# Patient Record
Sex: FEMALE | Race: WHITE | NOT HISPANIC OR LATINO | Employment: OTHER | URBAN - METROPOLITAN AREA
[De-identification: names, ages, dates, MRNs, and addresses within clinical notes are randomized per-mention and may not be internally consistent; named-entity substitution may affect disease eponyms.]

---

## 2024-05-23 ENCOUNTER — TELEPHONE (OUTPATIENT)
Dept: NEUROLOGY | Facility: CLINIC | Age: 59
End: 2024-05-23

## 2024-05-23 NOTE — TELEPHONE ENCOUNTER
----- Message from Natali Garcia MD sent at 2024 11:26 AM EDT -----  She last had her Emgality in March and it appears to me that her prior authorization  in January and the only reason she had it through March is because she gets a 3-month supply and she thought that she had enough refills not why she did not call us not realizing that it was the prior authorization that .  Not sure otherwise why we did not get a prior Auth renewal from what I can see. Thank you

## 2024-05-23 NOTE — TELEPHONE ENCOUNTER
----- Message from Natali Garcia MD sent at 5/23/2024 11:29 AM EDT -----  I prescribed Nurtec in February and it does not look like we ever got the prior authorization for this if you could help with this prior authorization as well as the Emgality on the last message please and thank you

## 2024-05-23 NOTE — TELEPHONE ENCOUNTER
Urgent Nurtec PA initiated on CMM.   Key: [id number]    Awaiting determination    If Jeff has not responded to your request within 24 hours, contact Jeff at [phone]

## 2024-05-23 NOTE — TELEPHONE ENCOUNTER
Key # below for emgality not going through CMM. Unable to submit    Created a new Key. (Key: [id number])  Constant spinning.    Will retry tmrw

## 2024-05-24 NOTE — TELEPHONE ENCOUNTER
Per CMM-  Message from Plan  Your PA has been resolved, no additional PA is required    Will call pharmacy after 0900

## 2024-05-24 NOTE — TELEPHONE ENCOUNTER
Per Ani  Approved on May 23  Your PA request has been approved. Additional information will be provided in the approval communication. (Message 1149)  Authorization Expiration Date: 2025    Approval letter faxed in media    Called [facility] pharmacy, spoke to Kanwal and advised of the below and above. She verbalized understanding. She got a paid claim for both meds, Nurtec $ 0 copay Emgality, copay $ 180. States that the manufacture coupon on their file . Pt must apply for new coupon card.     Called and advised pt of all of the below and above. She verbalized understanding.

## 2024-06-17 PROBLEM — E61.1 IRON DEFICIENCY: Status: ACTIVE | Noted: 2024-06-17

## 2024-06-17 PROBLEM — G25.81 RESTLESS LEG SYNDROME: Status: ACTIVE | Noted: 2024-06-17

## 2024-06-20 PROBLEM — Z09 HOSPITAL DISCHARGE FOLLOW-UP: Status: RESOLVED | Noted: 2024-01-30 | Resolved: 2024-06-20

## 2024-06-20 PROBLEM — J98.8 RTI (RESPIRATORY TRACT INFECTION): Status: RESOLVED | Noted: 2024-01-30 | Resolved: 2024-06-20

## 2024-06-20 PROBLEM — I10 DIASTOLIC HYPERTENSION: Status: RESOLVED | Noted: 2020-08-14 | Resolved: 2024-06-20

## 2024-06-26 ENCOUNTER — HOSPITAL ENCOUNTER (OUTPATIENT)
Dept: NON INVASIVE DIAGNOSTICS | Facility: CLINIC | Age: 59
Discharge: HOME | End: 2024-06-26
Payer: MEDICARE

## 2024-06-26 ENCOUNTER — HOSPITAL ENCOUNTER (OUTPATIENT)
Dept: ULTRASOUND IMAGING | Facility: HOSPITAL | Age: 59
Discharge: HOME | End: 2024-06-26
Payer: COMMERCIAL

## 2024-06-26 VITALS
HEART RATE: 68 BPM | SYSTOLIC BLOOD PRESSURE: 126 MMHG | HEIGHT: 64 IN | WEIGHT: 169 LBS | BODY MASS INDEX: 28.85 KG/M2 | DIASTOLIC BLOOD PRESSURE: 72 MMHG

## 2024-06-26 DIAGNOSIS — R07.89 OTHER CHEST PAIN: ICD-10-CM

## 2024-06-26 DIAGNOSIS — E04.9 THYROID ENLARGEMENT: ICD-10-CM

## 2024-06-26 LAB
AORTIC ROOT: 3 CM
APICAL FOUR CHAMBER EJECTION FRACTION: 51 %
ASCENDING AORTA: 3.5 CM
BSA FOR ECHO PROCEDURE: 1.82 M2
E WAVE DECELERATION TIME: 224 MS
E/A RATIO: 0.6
FRACTIONAL SHORTENING: 37 (ref 28–44)
INTERVENTRICULAR SEPTUM IN DIASTOLE (PARASTERNAL SHORT AXIS VIEW): 0.9 CM
INTERVENTRICULAR SEPTUM: 0.9 CM (ref 0.6–1.1)
LAAS-AP2: 12.8 CM2
LAAS-AP4: 15.7 CM2
LEFT ATRIUM SIZE: 3.4 CM
LEFT ATRIUM VOLUME (MOD BIPLANE): 36 ML
LEFT ATRIUM VOLUME INDEX (MOD BIPLANE): 19.8 ML/M2
LEFT INTERNAL DIMENSION IN SYSTOLE: 2.9 CM (ref 2.1–4)
LEFT VENTRICULAR INTERNAL DIMENSION IN DIASTOLE: 4.6 CM (ref 3.5–6)
LEFT VENTRICULAR POSTERIOR WALL IN END DIASTOLE: 0.9 CM
LEFT VENTRICULAR STROKE VOLUME: 63 ML
LVSV (TEICH): 63 ML
MV E'TISSUE VEL-SEP: 7 CM/S
MV PEAK A VEL: 0.89 M/S
MV PEAK E VEL: 53 CM/S
MV STENOSIS PRESSURE HALF TIME: 65 MS
MV VALVE AREA P 1/2 METHOD: 3.38
RIGHT ATRIUM AREA SYSTOLE A4C: 12.9 CM2
RIGHT VENTRICLE ID DIMENSION: 3 CM
SL CV LEFT ATRIUM LENGTH A2C: 4.5 CM
SL CV LV EF: 55
SL CV PED ECHO LEFT VENTRICLE DIASTOLIC VOLUME (MOD BIPLANE) 2D: 95 ML
SL CV PED ECHO LEFT VENTRICLE SYSTOLIC VOLUME (MOD BIPLANE) 2D: 32 ML
TR MAX PG: 22 MMHG
TR PEAK VELOCITY: 2.4 M/S
TRICUSPID ANNULAR PLANE SYSTOLIC EXCURSION: 2 CM
TRICUSPID VALVE PEAK REGURGITATION VELOCITY: 2.35 M/S

## 2024-06-26 PROCEDURE — 93306 TTE W/DOPPLER COMPLETE: CPT | Performed by: INTERNAL MEDICINE

## 2024-06-26 PROCEDURE — 76536 US EXAM OF HEAD AND NECK: CPT

## 2024-06-26 PROCEDURE — 93306 TTE W/DOPPLER COMPLETE: CPT

## 2024-06-28 ENCOUNTER — CONSULT (OUTPATIENT)
Dept: PULMONOLOGY | Facility: CLINIC | Age: 59
End: 2024-06-28
Payer: MEDICARE

## 2024-06-28 VITALS
WEIGHT: 167 LBS | OXYGEN SATURATION: 98 % | HEART RATE: 64 BPM | TEMPERATURE: 97.7 F | BODY MASS INDEX: 28.51 KG/M2 | DIASTOLIC BLOOD PRESSURE: 76 MMHG | SYSTOLIC BLOOD PRESSURE: 124 MMHG | HEIGHT: 64 IN

## 2024-06-28 DIAGNOSIS — R06.09 DYSPNEA ON EXERTION: Primary | ICD-10-CM

## 2024-06-28 DIAGNOSIS — M35.05 SJOGREN SYNDROME WITH INFLAMMATORY ARTHRITIS (HCC): ICD-10-CM

## 2024-06-28 DIAGNOSIS — M06.09 RHEUMATOID ARTHRITIS OF MULTIPLE SITES WITH NEGATIVE RHEUMATOID FACTOR (HCC): ICD-10-CM

## 2024-06-28 PROCEDURE — 99204 OFFICE O/P NEW MOD 45 MIN: CPT | Performed by: INTERNAL MEDICINE

## 2024-06-28 RX ORDER — LEVALBUTEROL TARTRATE 45 UG/1
1-2 AEROSOL, METERED ORAL EVERY 6 HOURS PRN
Qty: 15 G | Refills: 2 | Status: SHIPPED | OUTPATIENT
Start: 2024-06-28 | End: 2024-09-26
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
## 2024-07-01 ENCOUNTER — TELEPHONE (OUTPATIENT)
Dept: INFUSION CENTER | Facility: CLINIC | Age: 59
End: 2024-07-01

## 2024-07-01 DIAGNOSIS — G25.81 RESTLESS LEG SYNDROME: ICD-10-CM

## 2024-07-01 DIAGNOSIS — E61.1 IRON DEFICIENCY: Primary | ICD-10-CM

## 2024-07-01 RX ORDER — SODIUM CHLORIDE 9 MG/ML
20 INJECTION, SOLUTION INTRAVENOUS ONCE
Status: CANCELLED | OUTPATIENT
Start: 2024-07-03

## 2024-07-01 NOTE — TELEPHONE ENCOUNTER
Patient called regarding their first visit to infusion center. Reviewed where the infusion center is located and policies with patient regarding fragrances, electronic devices, snacks, home medications, visitor policy, and no show policy. No outstanding questions at this time. Patient aware of appointment on 7/3/24 at 11:30am.

## 2024-07-03 ENCOUNTER — HOSPITAL ENCOUNTER (OUTPATIENT)
Dept: INFUSION CENTER | Facility: CLINIC | Age: 59
Discharge: HOME | End: 2024-07-03
Payer: MEDICARE

## 2024-07-03 VITALS
HEART RATE: 81 BPM | DIASTOLIC BLOOD PRESSURE: 61 MMHG | SYSTOLIC BLOOD PRESSURE: 118 MMHG | RESPIRATION RATE: 17 BRPM | TEMPERATURE: 96.9 F

## 2024-07-03 DIAGNOSIS — E61.1 IRON DEFICIENCY: ICD-10-CM

## 2024-07-03 DIAGNOSIS — G25.81 RESTLESS LEG SYNDROME: Primary | ICD-10-CM

## 2024-07-03 PROCEDURE — 96365 THER/PROPH/DIAG IV INF INIT: CPT

## 2024-07-03 RX ORDER — SODIUM CHLORIDE 9 MG/ML
20 INJECTION, SOLUTION INTRAVENOUS ONCE
Status: CANCELLED | OUTPATIENT
Start: 2024-07-10

## 2024-07-03 RX ORDER — SODIUM CHLORIDE 9 MG/ML
20 INJECTION, SOLUTION INTRAVENOUS ONCE
Status: COMPLETED | OUTPATIENT
Start: 2024-07-03 | End: 2024-07-03

## 2024-07-03 RX ADMIN — SODIUM CHLORIDE 20 ML/HR: 0.9 INJECTION, SOLUTION INTRAVENOUS at 11:35

## 2024-07-03 RX ADMIN — IRON SUCROSE 200 MG: 20 INJECTION, SOLUTION INTRAVENOUS at 11:35

## 2024-07-03 NOTE — PROGRESS NOTES
Pt presents for venofer infusion offering no complaints. Peripheral IV inserted positive blood return noted. Pt tolerated treatment without incident. Peripheral IV removed. AVS declined, next appointment 7/10/24 at 1:30pm.

## 2024-07-10 ENCOUNTER — HOSPITAL ENCOUNTER (OUTPATIENT)
Dept: INFUSION CENTER | Facility: CLINIC | Age: 59
Discharge: HOME | End: 2024-07-10
Payer: MEDICARE

## 2024-07-10 VITALS
SYSTOLIC BLOOD PRESSURE: 143 MMHG | RESPIRATION RATE: 18 BRPM | TEMPERATURE: 96.9 F | HEART RATE: 77 BPM | DIASTOLIC BLOOD PRESSURE: 70 MMHG

## 2024-07-10 DIAGNOSIS — E61.1 IRON DEFICIENCY: Primary | ICD-10-CM

## 2024-07-10 DIAGNOSIS — G25.81 RESTLESS LEG SYNDROME: ICD-10-CM

## 2024-07-10 RX ORDER — SODIUM CHLORIDE 9 MG/ML
20 INJECTION, SOLUTION INTRAVENOUS ONCE
Status: CANCELLED | OUTPATIENT
Start: 2024-07-17

## 2024-07-10 RX ORDER — SODIUM CHLORIDE 9 MG/ML
20 INJECTION, SOLUTION INTRAVENOUS ONCE
Status: COMPLETED | OUTPATIENT
Start: 2024-07-10 | End: 2024-07-10

## 2024-07-10 RX ADMIN — IRON SUCROSE 200 MG: 20 INJECTION, SOLUTION INTRAVENOUS at 13:43

## 2024-07-10 RX ADMIN — SODIUM CHLORIDE 20 ML/HR: 9 INJECTION, SOLUTION INTRAVENOUS at 13:41

## 2024-07-10 NOTE — PROGRESS NOTES
Baylee Villafuerte presents for iv venofer offers no complaints. Infusion completed, pt self medicated with zofran OTC due to nausea from iron.     Baylee Villafuerte is aware of future appt on 7/17 at 1230.     AVS  No (Declined by Baylee Villafuerte) d/c from unit stable

## 2024-07-17 ENCOUNTER — HOSPITAL ENCOUNTER (OUTPATIENT)
Dept: INFUSION CENTER | Facility: CLINIC | Age: 59
Discharge: HOME | End: 2024-07-17
Payer: MEDICARE

## 2024-07-17 VITALS
TEMPERATURE: 96.7 F | SYSTOLIC BLOOD PRESSURE: 138 MMHG | HEART RATE: 79 BPM | RESPIRATION RATE: 16 BRPM | DIASTOLIC BLOOD PRESSURE: 84 MMHG

## 2024-07-17 DIAGNOSIS — E61.1 IRON DEFICIENCY: Primary | ICD-10-CM

## 2024-07-17 DIAGNOSIS — G25.81 RESTLESS LEG SYNDROME: ICD-10-CM

## 2024-07-17 PROCEDURE — 96365 THER/PROPH/DIAG IV INF INIT: CPT

## 2024-07-17 RX ORDER — SODIUM CHLORIDE 9 MG/ML
20 INJECTION, SOLUTION INTRAVENOUS ONCE
Status: CANCELLED | OUTPATIENT
Start: 2024-07-24

## 2024-07-17 RX ORDER — SODIUM CHLORIDE 9 MG/ML
20 INJECTION, SOLUTION INTRAVENOUS ONCE
Status: COMPLETED | OUTPATIENT
Start: 2024-07-17 | End: 2024-07-17

## 2024-07-17 RX ADMIN — IRON SUCROSE 200 MG: 20 INJECTION, SOLUTION INTRAVENOUS at 12:42

## 2024-07-17 RX ADMIN — SODIUM CHLORIDE 20 ML/HR: 9 INJECTION, SOLUTION INTRAVENOUS at 12:38

## 2024-07-17 NOTE — PROGRESS NOTES
Pt to clinic for Venofer. Pt offers no complaints. Pt tolerated infusion without complications. PIV removed. Pt aware of next appointment on 7/24/24 at 1330. AVS declined.

## 2024-07-24 ENCOUNTER — HOSPITAL ENCOUNTER (OUTPATIENT)
Dept: INFUSION CENTER | Facility: CLINIC | Age: 59
Discharge: HOME | End: 2024-07-24
Payer: MEDICARE

## 2024-07-24 VITALS
SYSTOLIC BLOOD PRESSURE: 124 MMHG | RESPIRATION RATE: 18 BRPM | DIASTOLIC BLOOD PRESSURE: 70 MMHG | TEMPERATURE: 98.3 F | HEART RATE: 72 BPM

## 2024-07-24 DIAGNOSIS — E61.1 IRON DEFICIENCY: Primary | ICD-10-CM

## 2024-07-24 DIAGNOSIS — G25.81 RESTLESS LEG SYNDROME: ICD-10-CM

## 2024-07-24 PROCEDURE — 96365 THER/PROPH/DIAG IV INF INIT: CPT

## 2024-07-24 RX ORDER — SODIUM CHLORIDE 9 MG/ML
20 INJECTION, SOLUTION INTRAVENOUS ONCE
Status: CANCELLED | OUTPATIENT
Start: 2024-07-31

## 2024-07-24 RX ORDER — SODIUM CHLORIDE 9 MG/ML
20 INJECTION, SOLUTION INTRAVENOUS ONCE
Status: COMPLETED | OUTPATIENT
Start: 2024-07-24 | End: 2024-07-24

## 2024-07-24 RX ADMIN — SODIUM CHLORIDE 20 ML/HR: 9 INJECTION, SOLUTION INTRAVENOUS at 13:55

## 2024-07-24 RX ADMIN — IRON SUCROSE 200 MG: 20 INJECTION, SOLUTION INTRAVENOUS at 14:04

## 2024-07-24 NOTE — PROGRESS NOTES
Patient to clinic for venofer. Offers no complaints at this time. Denies recent illness and antibiotic use. Tolerated infusion without complications. PIV removed.  Aware of next appointment on 7/31/24 at 12:30 am. AVS declined.

## 2024-07-31 ENCOUNTER — HOSPITAL ENCOUNTER (OUTPATIENT)
Dept: INFUSION CENTER | Facility: CLINIC | Age: 59
Discharge: HOME | End: 2024-07-31
Payer: MEDICARE

## 2024-07-31 VITALS
RESPIRATION RATE: 18 BRPM | TEMPERATURE: 98.5 F | HEART RATE: 68 BPM | SYSTOLIC BLOOD PRESSURE: 137 MMHG | DIASTOLIC BLOOD PRESSURE: 75 MMHG

## 2024-07-31 DIAGNOSIS — E61.1 IRON DEFICIENCY: Primary | ICD-10-CM

## 2024-07-31 DIAGNOSIS — G25.81 RESTLESS LEG SYNDROME: ICD-10-CM

## 2024-07-31 PROCEDURE — 96365 THER/PROPH/DIAG IV INF INIT: CPT

## 2024-07-31 RX ORDER — SODIUM CHLORIDE 9 MG/ML
20 INJECTION, SOLUTION INTRAVENOUS ONCE
Status: CANCELLED | OUTPATIENT
Start: 2024-08-07

## 2024-07-31 RX ORDER — SODIUM CHLORIDE 9 MG/ML
20 INJECTION, SOLUTION INTRAVENOUS ONCE
Status: COMPLETED | OUTPATIENT
Start: 2024-07-31 | End: 2024-07-31

## 2024-07-31 RX ADMIN — SODIUM CHLORIDE 20 ML/HR: 9 INJECTION, SOLUTION INTRAVENOUS at 13:10

## 2024-07-31 RX ADMIN — IRON SUCROSE 200 MG: 20 INJECTION, SOLUTION INTRAVENOUS at 13:09

## 2024-07-31 NOTE — PROGRESS NOTES
Pt to clinic for venofer, offers no complaints today but states she started Keflex for a UTI on Monday this week, spoke with Becki Peguero RN who stated per Edin Lakhani PA-C pt okay to proceed with venofer infusion today, pt tolerated infusion without complications, aware this is her last infusion appointment, PIV removed, avs declined.

## 2024-08-01 ENCOUNTER — OFFICE VISIT (OUTPATIENT)
Dept: GASTROENTEROLOGY | Facility: CLINIC | Age: 59
End: 2024-08-01
Payer: MEDICARE

## 2024-08-01 VITALS
WEIGHT: 167.8 LBS | TEMPERATURE: 97.4 F | DIASTOLIC BLOOD PRESSURE: 78 MMHG | OXYGEN SATURATION: 96 % | HEIGHT: 64 IN | BODY MASS INDEX: 28.65 KG/M2 | SYSTOLIC BLOOD PRESSURE: 121 MMHG | HEART RATE: 67 BPM

## 2024-08-01 DIAGNOSIS — R11.0 NAUSEA: Primary | ICD-10-CM

## 2024-08-01 DIAGNOSIS — K21.9 GASTROESOPHAGEAL REFLUX DISEASE WITHOUT ESOPHAGITIS: ICD-10-CM

## 2024-08-01 DIAGNOSIS — R14.0 BLOATING: ICD-10-CM

## 2024-08-01 DIAGNOSIS — K58.2 IRRITABLE BOWEL SYNDROME WITH BOTH CONSTIPATION AND DIARRHEA: ICD-10-CM

## 2024-08-01 PROCEDURE — 99213 OFFICE O/P EST LOW 20 MIN: CPT | Performed by: PHYSICIAN ASSISTANT

## 2024-08-01 RX ORDER — CEPHALEXIN 500 MG/1
CAPSULE ORAL
COMMUNITY
Start: 2024-07-28
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
## 2024-08-06 ENCOUNTER — OFFICE VISIT (OUTPATIENT)
Age: 59
End: 2024-08-06
Payer: MEDICARE

## 2024-08-06 VITALS
BODY MASS INDEX: 28.68 KG/M2 | WEIGHT: 168 LBS | HEART RATE: 66 BPM | SYSTOLIC BLOOD PRESSURE: 110 MMHG | TEMPERATURE: 97.8 F | HEIGHT: 64 IN | DIASTOLIC BLOOD PRESSURE: 80 MMHG

## 2024-08-06 DIAGNOSIS — Z01.419 ENCOUNTER FOR GYNECOLOGICAL EXAMINATION (GENERAL) (ROUTINE) WITHOUT ABNORMAL FINDINGS: Primary | ICD-10-CM

## 2024-08-06 DIAGNOSIS — N95.2 VAGINAL ATROPHY: ICD-10-CM

## 2024-08-06 DIAGNOSIS — Z12.31 ENCOUNTER FOR SCREENING MAMMOGRAM FOR MALIGNANT NEOPLASM OF BREAST: ICD-10-CM

## 2024-08-06 PROCEDURE — G0101 CA SCREEN;PELVIC/BREAST EXAM: HCPCS | Performed by: STUDENT IN AN ORGANIZED HEALTH CARE EDUCATION/TRAINING PROGRAM

## 2024-08-06 RX ORDER — ESTRADIOL 0.1 MG/G
2 CREAM VAGINAL DAILY
Qty: 42 G | Refills: 3 | Status: SHIPPED | OUTPATIENT
Start: 2024-08-06
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
## 2024-08-07 ENCOUNTER — HOSPITAL ENCOUNTER (OUTPATIENT)
Dept: PULMONOLOGY | Facility: HOSPITAL | Age: 59
Discharge: HOME | End: 2024-08-07
Payer: MEDICARE

## 2024-08-07 DIAGNOSIS — M35.05 SJOGREN SYNDROME WITH INFLAMMATORY ARTHRITIS (HCC): ICD-10-CM

## 2024-08-07 DIAGNOSIS — R06.09 DYSPNEA ON EXERTION: ICD-10-CM

## 2024-08-07 DIAGNOSIS — M06.09 RHEUMATOID ARTHRITIS OF MULTIPLE SITES WITH NEGATIVE RHEUMATOID FACTOR (HCC): ICD-10-CM

## 2024-08-07 PROCEDURE — 94726 PLETHYSMOGRAPHY LUNG VOLUMES: CPT | Performed by: INTERNAL MEDICINE

## 2024-08-07 PROCEDURE — 94729 DIFFUSING CAPACITY: CPT

## 2024-08-07 PROCEDURE — 94060 EVALUATION OF WHEEZING: CPT

## 2024-08-07 PROCEDURE — 94760 N-INVAS EAR/PLS OXIMETRY 1: CPT

## 2024-08-07 PROCEDURE — 94060 EVALUATION OF WHEEZING: CPT | Performed by: INTERNAL MEDICINE

## 2024-08-07 PROCEDURE — 94726 PLETHYSMOGRAPHY LUNG VOLUMES: CPT

## 2024-08-07 PROCEDURE — 94729 DIFFUSING CAPACITY: CPT | Performed by: INTERNAL MEDICINE

## 2024-08-07 RX ORDER — ALBUTEROL SULFATE 0.83 MG/ML
2.5 SOLUTION RESPIRATORY (INHALATION) ONCE
Status: COMPLETED | OUTPATIENT
Start: 2024-08-07 | End: 2024-08-07

## 2024-08-07 RX ADMIN — ALBUTEROL SULFATE 2.5 MG: 2.5 SOLUTION RESPIRATORY (INHALATION) at 11:31

## 2024-08-12 ENCOUNTER — OFFICE VISIT (OUTPATIENT)
Dept: CARDIOLOGY CLINIC | Facility: CLINIC | Age: 59
End: 2024-08-12
Payer: MEDICARE

## 2024-08-12 VITALS
DIASTOLIC BLOOD PRESSURE: 76 MMHG | WEIGHT: 168 LBS | SYSTOLIC BLOOD PRESSURE: 136 MMHG | HEIGHT: 64 IN | OXYGEN SATURATION: 97 % | HEART RATE: 84 BPM | BODY MASS INDEX: 28.68 KG/M2

## 2024-08-12 DIAGNOSIS — R07.89 OTHER CHEST PAIN: Primary | ICD-10-CM

## 2024-08-12 DIAGNOSIS — R00.2 PALPITATIONS: ICD-10-CM

## 2024-08-12 DIAGNOSIS — E78.00 HYPERCHOLESTEROLEMIA: ICD-10-CM

## 2024-08-12 PROCEDURE — 99204 OFFICE O/P NEW MOD 45 MIN: CPT | Performed by: INTERNAL MEDICINE
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
## 2024-08-13 DIAGNOSIS — B00.1 COLD SORE: ICD-10-CM

## 2024-08-13 DIAGNOSIS — E78.00 HYPERCHOLESTEROLEMIA: ICD-10-CM

## 2024-08-13 DIAGNOSIS — E55.9 VITAMIN D DEFICIENCY: ICD-10-CM

## 2024-08-13 DIAGNOSIS — G43.009 MIGRAINE WITHOUT AURA AND WITHOUT STATUS MIGRAINOSUS, NOT INTRACTABLE: ICD-10-CM

## 2024-08-13 DIAGNOSIS — F51.01 PRIMARY INSOMNIA: ICD-10-CM

## 2024-08-13 RX ORDER — ERGOCALCIFEROL 1.25 MG/1
CAPSULE, LIQUID FILLED ORAL
Qty: 12 CAPSULE | Refills: 0 | Status: SHIPPED | OUTPATIENT
Start: 2024-08-13

## 2024-08-13 RX ORDER — ACYCLOVIR 50 MG/G
OINTMENT TOPICAL AS NEEDED
Qty: 30 G | Refills: 0 | Status: SHIPPED | OUTPATIENT
Start: 2024-08-13

## 2024-08-13 RX ORDER — MIRTAZAPINE 45 MG/1
TABLET, FILM COATED ORAL
Qty: 100 TABLET | Refills: 1 | Status: SHIPPED | OUTPATIENT
Start: 2024-08-13

## 2024-08-13 RX ORDER — ATORVASTATIN CALCIUM 10 MG/1
10 TABLET, FILM COATED ORAL DAILY
Qty: 90 TABLET | Refills: 1 | Status: SHIPPED | OUTPATIENT
Start: 2024-08-13

## 2024-08-13 RX ORDER — RIZATRIPTAN BENZOATE 10 MG/1
TABLET ORAL
Qty: 9 TABLET | Refills: 5 | Status: SHIPPED | OUTPATIENT
Start: 2024-08-13

## 2024-08-15 ENCOUNTER — TELEPHONE (OUTPATIENT)
Dept: GASTROENTEROLOGY | Facility: CLINIC | Age: 59
End: 2024-08-15

## 2024-08-21 ENCOUNTER — OFFICE VISIT (OUTPATIENT)
Age: 59
End: 2024-08-21
Payer: MEDICARE

## 2024-08-21 VITALS
HEIGHT: 64 IN | OXYGEN SATURATION: 97 % | DIASTOLIC BLOOD PRESSURE: 71 MMHG | TEMPERATURE: 98.2 F | HEART RATE: 71 BPM | SYSTOLIC BLOOD PRESSURE: 130 MMHG | WEIGHT: 167 LBS | RESPIRATION RATE: 16 BRPM | BODY MASS INDEX: 28.51 KG/M2

## 2024-08-21 DIAGNOSIS — R06.09 DYSPNEA ON EXERTION: Primary | ICD-10-CM

## 2024-08-21 DIAGNOSIS — M35.05 SJOGREN SYNDROME WITH INFLAMMATORY ARTHRITIS (HCC): ICD-10-CM

## 2024-08-21 DIAGNOSIS — M06.09 RHEUMATOID ARTHRITIS OF MULTIPLE SITES WITH NEGATIVE RHEUMATOID FACTOR (HCC): ICD-10-CM

## 2024-08-21 PROCEDURE — G2211 COMPLEX E/M VISIT ADD ON: HCPCS | Performed by: INTERNAL MEDICINE

## 2024-08-21 PROCEDURE — 99214 OFFICE O/P EST MOD 30 MIN: CPT | Performed by: INTERNAL MEDICINE
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
## 2024-08-22 ENCOUNTER — HOSPITAL ENCOUNTER (OUTPATIENT)
Dept: NON INVASIVE DIAGNOSTICS | Facility: HOSPITAL | Age: 59
Discharge: HOME | End: 2024-08-22
Payer: MEDICARE

## 2024-08-22 VITALS — SYSTOLIC BLOOD PRESSURE: 140 MMHG | DIASTOLIC BLOOD PRESSURE: 76 MMHG | HEART RATE: 71 BPM | OXYGEN SATURATION: 98 %

## 2024-08-22 DIAGNOSIS — R00.2 PALPITATIONS: ICD-10-CM

## 2024-08-22 DIAGNOSIS — R07.89 OTHER CHEST PAIN: ICD-10-CM

## 2024-08-22 LAB
MAX HR PERCENT: 87 %
MAX HR: 141 BPM
RATE PRESSURE PRODUCT: NORMAL
SL CV STRESS RECOVERY BP: NORMAL MMHG
SL CV STRESS RECOVERY HR: 83 BPM
SL CV STRESS RECOVERY O2 SAT: 98 %
SL CV STRESS STAGE REACHED: 2
STRESS ANGINA INDEX: 0
STRESS BASELINE BP: NORMAL MMHG
STRESS BASELINE HR: 71 BPM
STRESS O2 SAT REST: 98 %
STRESS PEAK HR: 141 BPM
STRESS POST ESTIMATED WORKLOAD: 7 METS
STRESS POST EXERCISE DUR MIN: 4 MIN
STRESS POST EXERCISE DUR SEC: 31 SEC
STRESS POST O2 SAT PEAK: 98 %
STRESS POST PEAK BP: 170 MMHG

## 2024-08-22 PROCEDURE — 93016 CV STRESS TEST SUPVJ ONLY: CPT | Performed by: INTERNAL MEDICINE

## 2024-08-22 PROCEDURE — 93017 CV STRESS TEST TRACING ONLY: CPT

## 2024-08-22 PROCEDURE — 93018 CV STRESS TEST I&R ONLY: CPT | Performed by: INTERNAL MEDICINE

## 2024-08-23 LAB
CHEST PAIN STATEMENT: NORMAL
MAX DIASTOLIC BP: 72 MMHG
MAX PREDICTED HEART RATE: 161 BPM
PROTOCOL NAME: NORMAL
REASON FOR TERMINATION: NORMAL
STRESS POST EXERCISE DUR MIN: 4 MIN
STRESS POST EXERCISE DUR SEC: 31 SEC
STRESS POST PEAK HR: 141 BPM
STRESS POST PEAK SYSTOLIC BP: 170 MMHG
TARGET HR FORMULA: NORMAL
TEST INDICATION: NORMAL

## 2024-08-27 ENCOUNTER — TELEPHONE (OUTPATIENT)
Dept: NEUROLOGY | Facility: CLINIC | Age: 59
End: 2024-08-27

## 2024-08-27 ENCOUNTER — TELEMEDICINE (OUTPATIENT)
Dept: NEUROLOGY | Facility: CLINIC | Age: 59
End: 2024-08-27
Payer: MEDICARE

## 2024-08-27 DIAGNOSIS — G43.009 MIGRAINE WITHOUT AURA AND WITHOUT STATUS MIGRAINOSUS, NOT INTRACTABLE: Primary | ICD-10-CM

## 2024-08-27 DIAGNOSIS — G47.33 OSA (OBSTRUCTIVE SLEEP APNEA): ICD-10-CM

## 2024-08-27 PROCEDURE — 99215 OFFICE O/P EST HI 40 MIN: CPT | Performed by: PSYCHIATRY & NEUROLOGY

## 2024-08-27 NOTE — PATIENT INSTRUCTIONS
Continue to follow up with Dr. Louis for sleep apnea     Headache/migraine treatment:   Abortive medications (for immediate treatment of a headache):   It is ok to take ibuprofen, acetaminophen or naproxen (Advil, Tylenol,  Aleve, Excedrin) if they help your headaches you should limit these to No more than 3 times a week to avoid medication overuse/rebound headaches.      For your more moderate to severe migraines take this medication early   -     rimegepant sulfate (Nurtec) 75 mg TBDP; Take one NURTEC 75 mg at onset under tongue. Limit 1 in 24 hours.    - will need a prior Auth which may take 1 to 2 weeks and if you do not hear anything let us know and there is a coupon card on the website for the co-pay.  If your insurance prefers Ubrelvy it is a similar medication I will send that in instead.    Rizatriptan 10 mg tabs - take one at the onset of headache. May repeat one time after 1-2 hours if pain has not resolved.   (Max 2 a day and 9 a month)        Prescription preventive medications for headaches/migraines   (to take every day to help prevent headaches - not to take at the time of headache):  [x]Emgality/Galcanezumab  120 mg injections every 28 days     Keep out of direct sunlight. Prior to administration, allow to come to room temperature for 30 minutes. Do not warm using a heat source (eg, microwave or hot water). Do not shake. Administer in abdomen (avoiding 2 inches around the navel), thigh, upper arm, or buttocks avoiding areas of skin that are tender, bruised, red or hard. Deliver entire contents of single-use prefilled pen or syringe.       *Typically these types of medications take time untill you see the benefit, although some may see improvement in days, often it may take weeks, especially if the medication is being titrated up to a beneficial level. Please contact us if there are any concerns or questions regarding the medication.         Lifestyle Recommendations:  [x] SLEEP - Maintain a  regular sleep schedule: Adults need at least 7-8 hours of uninterrupted a night. Maintain good sleep hygiene:  Going to bed and waking up at consistent times, avoiding excessive daytime naps, avoiding caffeinated beverages in the evening, avoid excessive stimulation in the evening and generally using bed primarily for sleeping.  One hour before bedtime would recommend turning lights down lower, decreasing your activity (may read quietly, listen to music at a low volume). When you get into bed, should eliminate all technology (no texting, emailing, playing with your phone, iPad or tablet in bed).  [x] HYDRATION - Maintain good hydration.  Drink  2L of fluid a day (4 typical small water bottles)  [x] DIET - Maintain good nutrition. In particular don't skip meals and try and eat healthy balanced meals regularly.  [x] TRIGGERS - Look for other triggers and avoid them: Limit caffeine to 1-2 cups a day or less. Avoid dietary triggers that you have noticed bring on your headaches (this could include aged cheese, peanuts, MSG, aspartame and nitrates).  [x] EXERCISE - physical exercise as we all know is good for you in many ways, and not only is good for your heart, but also is beneficial for your mental health, cognitive health and  chronic pain/headaches. I would encourage at the least 5 days of physical exercise weekly for at least 30 minutes.      Education and Follow-up  [x] Please call with any questions or concerns. Of course if any new concerning symptoms go to the emergency department.  [x] Follow up 3-6 months, sooner if needed

## 2024-08-27 NOTE — TELEPHONE ENCOUNTER
Called patient to get her 3-6 mon F/U scheduled with Dr Garcia, patient was unable to answer the phone, left  with a call back number.  Printed out AVS and mailed to patient as well.

## 2024-08-27 NOTE — PROGRESS NOTES
Review of Systems   Constitutional:  Negative for appetite change, fatigue and fever.   HENT: Negative.  Negative for hearing loss, tinnitus, trouble swallowing and voice change.    Eyes: Negative.  Negative for photophobia, pain and visual disturbance.   Respiratory: Negative.  Negative for shortness of breath.    Cardiovascular: Negative.  Negative for palpitations.   Gastrointestinal: Negative.  Negative for nausea and vomiting.   Endocrine: Negative.  Negative for cold intolerance.   Genitourinary: Negative.  Negative for dysuria, frequency and urgency.   Musculoskeletal:  Negative for back pain, gait problem, myalgias, neck pain and neck stiffness.   Skin: Negative.  Negative for rash.   Allergic/Immunologic: Negative.    Neurological:  Positive for headaches (down to one migraine a week). Negative for dizziness, tremors, seizures, syncope, facial asymmetry, speech difficulty, weakness, light-headedness and numbness.   Hematological: Negative.  Does not bruise/bleed easily.   Psychiatric/Behavioral: Negative.  Negative for confusion, hallucinations and sleep disturbance.

## 2024-08-27 NOTE — PROGRESS NOTES
Virtual Regular Visit  Name: Baylee Villafuerte      : 1965      MRN: [id number]  Encounter Provider: Natali Garcia MD  Encounter Date: 2024   Encounter department: NEUROLOGY [facility]    Verification of patient location:    Patient is located at Home in the following state in which I hold an active license [place]    Assessment & Plan   1. Migraine without aura and without status migrainosus, not intractable  -     Galcanezumab-gnlm 120 MG/ML SOAJ; Inject 120 mg under the skin every 28 days (3 month supply so she doesn't have to wait for the prescription delayed every month)  2. DIPTI (obstructive sleep apnea)  -     Ambulatory Referral to Otolaryngology; Future        Encounter provider Natali Garcia MD    The patient was identified by name and date of birth. Baylee Villafuerte was informed that this is a telemedicine visit and that the visit is being conducted through the Epic Embedded platform. She agrees to proceed..  My office door was closed. No one else was in the room.  She acknowledged consent and understanding of privacy and security of the video platform. The patient has agreed to participate and understands they can discontinue the visit at any time.    Patient is aware this is a billable service.     History of Present Illness       Assessment/Plan:   Baylee Reza is a very pleasant 58 y.o. female with a past medical history that includes migraines, anxiety, depression, arthralgia, HTN, GERD,  IBS, hypercholesteremia, iron deficiency anemia, kidney stones, osteopenia, insomnia referred here for evaluation of headache.  My initial evaluation 2020     Migraine without aura without status migrainosus, not intractable  Features of idiopathic intracranial hypertension on imaging not meeting criteria for IIH without papilledema  DIPTI not currently using CPAP (home study, BRANDI 16.1, supine 18.8, non supine 3.5, Oxygen down to 75%, while on acetazolamide/Diamox  which also can treat sleep apnea and likely makes this number lower than it is) -following with sleep medicine -8/27/2024 also placed referral to ENT to consider inspire since no longer using CPAP  She reports a long history of headaches and migraines that have varied in frequency and severity throughout life.  Pain is typically left frontal/retro-orbital and sometimes in the back.  She denies aura and reports typical associated migrainous features.  - as of 8/19/2020: chronic daily headaches and Migraines 3-4 days per week for the past year or so  - as of 1/29/2021:   Migraines have drastically improved to only 2-3 per month.  She did not start any new preventative and since she is so improved will hold off on adding anything at this time.  Continue sumatriptan 100 mg as needed for abortive.  - as of 8/27/2021: She reports Migraines a little worse to about 4 migraines a month, willing to add preventative as sumatriptan does not always work well, trial of emgality.   - as of 1/27/2022: Emgality helped a little initially after first loading dose and then pharmacy said she did not have refills and she did not call us so has not had since Sept. Now under a lot of stress, getting about 4 migraines a month, daily mild migraines/headaches. Resume emgality one shot monthly. Continue sumatriptan for abortive.   - as of 7/28/2022: She reports significant improvement on emgality with decreased frequency and severity of migraines to mild 4 a month, and 2 severe a month. Did not realize she could still take sumatriptan so was just taking OTC meds, refilled sumatriptan/imitrex. toradol IM today for migraine since yesterday.  - as of 1/20/2023: She was doing great on Emgality, but last dose was in September or October due to mom passing away and had to leave town to help. Will resume emgality monthly as migraines are again 2-3 days a week, trial of rizatriptan in place of sumatriptan.   - as of 6/21/2023: She reports improvement  on emgality, but not as significant improvement as would be expected and she believes due to the lapse in getting it and we discussed I believe it may be due to DIPTI causing subclinical IIH picture and highly recommend trial of acetazolamide/Diamox.  I believe subclinical IIH actually may be contributing to more symptoms than realized due to tight cerebellar anatomy.  Sleep study to evaluate for sleep apnea although home study can miss it, will likely need in lab.  We discussed this may be why she needs to sleep aids, blood pressure pill and it may be contributing to her vision issues that she has been told are cataracts only.  We discussed sleep apnea can cause significant morbidity/mortality issues if untreated.  Trial of rizatriptan works better than sumatriptan although she is not taking it lately due to starting methotrexate and was told not to take anything on it.  Review of recent CT shows multiple signs of increased intracranial pressure.   - as of 9/21/23: She reports improvement since last visit with on average 2 headaches a week due to emgality decreasing her migraines and acetazolamide 125 mg TID decreasing suspected subtle increased intracranial pressure contributing, following closely with eye doctors and upcoming cataract surgery and no history of papilledema to call this IIH per standard criteria. PCP stopped her chlorthalidone due to BP normalizing. She reports being able to sleep longer and nap more, and I again encouraged scheduling sleep study, interestingly diamox also treats some forms of sleep apnea. Will increase diamox as tolerated to 125 mg 4 times a day while a wake to try and prevent wearing off and add night time dosing if needed when it would be wearing off the longest.  - as of 11/14/2023: She reports migraines remain improved on Emgality with on average 3 a week and rizatriptan typically helps without side effects.  She reports acetazolamide/Diamox 125 mg every 4 hours is helping  although likely not enough especially since she is hardly sleeping at all and we discussed switching to 500 mg twice daily to see if this can help more with less wearing off effect and rebound high blood pressure and can help last throughout the night.  She indeed was diagnosed with sleep apnea at a moderate level and we discussed I highly recommend CPAP and nothing less for treatment despite being positional and she will be following up with sleep medicine in January.  In the meantime if needed can sleep on side or propped up in chair.  Following with eye doctor with persistent vision issues and he consistently does not see papilledema or signs that this would be related although we discussed I think it may be potentially impacting cataracts and she does have upcoming cataract surgery.   - as of 2/22/2024: She has had a difficult time tolerating CPAP thus far and we discussed some of the symptoms she thinks are related to the CPAP side effects sound potentially related to acetazolamide wearing off at 8-hour lucia rather than 12 and could consider increasing to 500 mg long-acting every 8 hours however she reports bothersome side effects and therefore will instead transition to previous 125 mg every 4 hours for 1 to 2 weeks and then can take as needed if needed.  I suspect her headaches could get worse off of this medication, but treating the sleep apnea would help with this, and she received a new mask today that she is going to try.  Waking up with headaches also likely related to wearing off effect, otherwise 2-3 a week.  Emgality has decreased migraine days and will continue.  Rizatriptan worked more in the past and will add trial of Nurtec to see if this can help more and also help with prevention the more she takes it as well as with wearing off of emgality.   - as of 5/23/2024: She reports 1-2 migraines a week and rizatriptan helps sometimes and not others and she never received the Nurtec trial.  This is off  of Emgality the last 2 months with last dose in March.  It appears the prior authorization  in January and we were not aware (but got through March due to 3 month supply), recommended she let us know in the future and will have the nursing team work on the prior Auth now.  Will also have the nursing team look into why the prior authorization was never sent to us for Nurtec can help with this as the more she takes Nurtec it also can help with prevention. She followed up with sleep medicine, they changed her from nasal pillow to fullface, decrease the pressure and is now using her CPAP more regularly, although may not use it some nights on average 7 and half hours with residual AHI 2.2.   - as of 2024: She reports headaches and migraines have improved since last visit and now on average once a week that improves with Nurtec.  She is not currently treating her sleep apnea and we discussed the potential morbidity/mortality of untreated sleep apnea especially at the moderate level with oxygen dropping to 75% and likely underestimated while on acetazolamide/Diamox at the time of the study.  I would recommend she make a follow-up with sleep medicine to discuss sleep apnea being untreated and other options.  She is open to referral to Dr. Munoz to discuss inspire as an alternative as she already wanted to follow-up with ENT as well.  Continue Emgality for migraine prevention.  Since last visit others added duloxetine/Cymbalta for fibromyalgia and she will be increasing it from 30 mg to 60 mg soon.     Workup:  -Noncontrast head CT 4/10/2023:  No acute intracranial abnormality. *we discussed as of my retrospective review 23, the nonspecific signs that I also see on MRI brain 2019 as below  - CTA head and neck with without contrast 2020:  1. No evidence of acute intracranial hemorrhage.  2. No evidence of hemodynamic significant stenosis, aneurysm or dissection.  - MRI brain with without contrast  08/01/2019:  A few nonspecific white matter lesions.  No acute infarction, intracranial hemorrhage or mass.*As of my retrospective review 2/22/2024 she has a partially empty sella that is nearly empty, bilateral optic nerve sheath prominence and tortuosity, tighter ventricles than expected for age 58, cerebellar tonsils overlie the foramen magnum with tight junction right greater than left without Chiari  - If she is open to it, we can obtain follow up MRI Brain to further evaluate at anytime     Preventative:  - we discussed headache hygiene and lifestyle factors that may improve headaches  -  continue Galcanezumab-gnlm 120 MG/ML SOAJ; Inject 120 mg under the skin every 28 days (3 month supply so she doesn't have to wait for the prescription delayed every month). Discussed proper use, possible side effects and risks.  - On through other providers:  mirtazapine 45 mg (through PCP, no longer using ambien at all in year), folic acid, (Chlorthalidone 25 mg was held due to diamox improving BP and they did not resume after she stopped acetazolamide), hydroxychloriquine for years now, Methotrexate since June 2023 and then increased 8/8/23, duloxetine/cymbalta in am 30 mg in July 2024 and this week will increase to 60 mg (for fibromyalgia pain - first two weeks cramps and N/V SE), Vit B12 1000 mcg, estradiol/estrace vaginal cream started since last visit and not used yet, Vit D 50,000 units, atorvastatin/lipitor 10 mg, Rabeprazole/Aciphex 40 mg twice daily for GERD   - past/failed:  Amitriptyline, mirtazapine, topiramate,  Ambien, aimovig contraindicated due to constiptation and latex allergy, Chlorthalidone, acetazolamide SE dry mouth (on top of baseline dry mouth that she has just as bad off of it due to Sjogren's) and tingling bothersome and wore off at 8 hours but couldn't feel she could increase to TID due to SE  - future options:   alternative CGRP med, botox     Abortive:  - discussed not taking over-the-counter or  prescription pain medications more than 3 days per week to prevent medication overuse/rebound headache  - through other providers:  Ondansetron helps nausea  -   rimegepant sulfate (Nurtec) 75 mg TBDP; Take one NURTEC 75 mg at onset under tongue. Limit 1 in 24 hours. Discussed proper use, possible side effects and risks.  -Rizatriptan 10 mg helps the most severe migraines, takes infrequently. Discussed proper use, possible side effects and risks.  - past/failed:  Sumatriptan 50 mg 100 mg, rizatriptan   - past/helped: toradol IM helped, steroids tolerated - prefers not to have it due to stomach irritation   - future options:  prochlorperazine, metoclopramide, Toradol IM or p.o., could consider trial for 5 days of Depakote or dexamethasone 4 prolonged migraine, ubrelvy, reyvow     Patient instructions        Continue to follow up with Dr. Louis for sleep apnea     Headache/migraine treatment:   Abortive medications (for immediate treatment of a headache):   It is ok to take ibuprofen, acetaminophen or naproxen (Advil, Tylenol,  Aleve, Excedrin) if they help your headaches you should limit these to No more than 3 times a week to avoid medication overuse/rebound headaches.      For your more moderate to severe migraines take this medication early   -     rimegepant sulfate (Nurtec) 75 mg TBDP; Take one NURTEC 75 mg at onset under tongue. Limit 1 in 24 hours.    - will need a prior Auth which may take 1 to 2 weeks and if you do not hear anything let us know and there is a coupon card on the website for the co-pay.  If your insurance prefers Ubrelvy it is a similar medication I will send that in instead.    Rizatriptan 10 mg tabs - take one at the onset of headache. May repeat one time after 1-2 hours if pain has not resolved.   (Max 2 a day and 9 a month)        Prescription preventive medications for headaches/migraines   (to take every day to help prevent headaches - not to take at the time of  headache):  [x]Emgality/Galcanezumab  120 mg injections every 28 days     Keep out of direct sunlight. Prior to administration, allow to come to room temperature for 30 minutes. Do not warm using a heat source (eg, microwave or hot water). Do not shake. Administer in abdomen (avoiding 2 inches around the navel), thigh, upper arm, or buttocks avoiding areas of skin that are tender, bruised, red or hard. Deliver entire contents of single-use prefilled pen or syringe.       *Typically these types of medications take time untill you see the benefit, although some may see improvement in days, often it may take weeks, especially if the medication is being titrated up to a beneficial level. Please contact us if there are any concerns or questions regarding the medication.         Lifestyle Recommendations:  [x] SLEEP - Maintain a regular sleep schedule: Adults need at least 7-8 hours of uninterrupted a night. Maintain good sleep hygiene:  Going to bed and waking up at consistent times, avoiding excessive daytime naps, avoiding caffeinated beverages in the evening, avoid excessive stimulation in the evening and generally using bed primarily for sleeping.  One hour before bedtime would recommend turning lights down lower, decreasing your activity (may read quietly, listen to music at a low volume). When you get into bed, should eliminate all technology (no texting, emailing, playing with your phone, iPad or tablet in bed).  [x] HYDRATION - Maintain good hydration.  Drink  2L of fluid a day (4 typical small water bottles)  [x] DIET - Maintain good nutrition. In particular don't skip meals and try and eat healthy balanced meals regularly.  [x] TRIGGERS - Look for other triggers and avoid them: Limit caffeine to 1-2 cups a day or less. Avoid dietary triggers that you have noticed bring on your headaches (this could include aged cheese, peanuts, MSG, aspartame and nitrates).  [x] EXERCISE - physical exercise as we all know is  good for you in many ways, and not only is good for your heart, but also is beneficial for your mental health, cognitive health and  chronic pain/headaches. I would encourage at the least 5 days of physical exercise weekly for at least 30 minutes.      Education and Follow-up  [x] Please call with any questions or concerns. Of course if any new concerning symptoms go to the emergency department.  [x] Follow up 3-6 months, sooner if needed          CC:   We had the pleasure of evaluating Baylee Reza in neurological consultation today. Baylee Reza is a   right handed female who presents today for evaluation of headaches.      History obtained from patient as well as available medical record review.  History of Present Illness:   Interval history as of 8/27/2024  - no significant new or concerning neurologic symptoms since last visit   - DIPTI on CPAP (home study, BRANDI 16.1, supine 18.8, non supine 3.5, Oxygen down to 75%, while on acetazolamide/Diamox which also can treat sleep apnea and likely makes this number lower than it is) -following with sleep medicine - has not been using it at all right now, was struggling and stopped in the middle of June, not sleeping either, dry mouth, gas and atypical CP - following with Dr. Louis    Headaches and migraines   She reports migraines have improved to only approximately once per week and typically respond to Nurtec    However there may be months where she takes more than 8 tabs for headaches that could turn into migraines and therefore needs 16 tabs a month    Preventative:   - emgality every 28 days. -3 mo supply -PA 5/23/25  Denies bothersome side effects      - On through other providers:  mirtazapine 45 mg (through PCP, no longer using ambien at all in year), folic acid, Chlorthalidone 25 mg (held due to diamox improving BP), Has been taking hydroxychloriquine for 1.5 years now, Methotrexate since June 2023 and then increased 8/8/23, duloxetine/cymbalta in am 30 mg  in July 2024 and this week will increase to 60 mg (for fibromyalgia pain - first two weeks cramps and N/V SE), Vit B12 1000 mcg, estradiol/estrace vaginal cream started since last visit and not used yet, Vit D 50,000, atorvastatin/lipitor 10 mg     Abortive:   Trial of Nurtec- works well - happy with it - no copay - initially they only approved 8 and now they let her have 16   - rizatriptan - only used once when very severe since getting nurtec  Denies bothersome side effects        Interval history as of 5/23/2024  - no significant new or concerning neurologic symptoms since last visit   -Since last visit she followed up with GI for GERD - took her off diclyclomine as that can cause dry mouth, PCP managing vitamin D, mammography, tongue sometimes feels thick and burns, whole body pain  - DIPTI  on CPAP  -she followed back up with sleep medicine 4/22/2024- she reports she is still struggling and allergies issues at times, mouth mask makes dry mouth worse from sjorgrens, nasal pillow aerophagia   He raised the humidity and lowered the pressure  He also referred her to hem for low ferritin and referred to heme for iron transfusion   Compliance from 3/17 - 4/15  More than 4 hours 63%, 7 hours and 27 minutes  On APAP 5-15  95 percentile pressure 11.5  Median leak 8.0  Residual AHI 2.2    Headaches and migraines   Baylee gets 1-2 migraines a week and Rizatriptan helps.     Likely more milder headaches that are not as bad     Preventative:   -Acetazolamide/diamox - as of last visit she wanted to come off due to side effects and therefore we transitioned her back to the short acting and then discontinued    - emgality every 28 days. PA Good through 1/28/24 -last dose in March and feels overwhelmed with her medical issues at times and therefore did not let us know    - On through other providers:  mirtazapine 45 mg, (no longer using ambien 5-10 mg tab not used in 4-5 months), folic acid, Chlorthalidone 25 mg (held due to  diamox improving BP), Has been taking hydroxychloriquine for 1.5 years now, Methotrexate since June 2023 and then increased 8/8/23   Denies bothersome side effects     Abortive:   Trial of Nurtec-never received and it looks like we never got the prior authorization form  Rizatriptan -does not always work well sometimes helps sometimes not very much  Denies bothersome side effects     Interval history as of 2/22/2024  - no significant new or concerning neurologic symptoms since last visit   - cataract surgery - Dec 2023 - went well, heavy eyes when not sleeping well, no nerve problems no papilledema  - Jan 26, 2023 ER for deep chest pain, viral illness negative, walking PNA and abs prescribed    - DIPTI not on CPAP right now, tried 2 weeks   - running nose in the morning, acid reflux   - doctor may have to adjust to pressure, goes up to 11 and dry mouth driving her crazy  - had the nose one and today got a new one that goes over her nose, can't do the whole mouth due to dry mouth    Headaches and migraines   Waking up headaches   2-3 a week     Preventative:   -Acetazolamide/diamox 125 mg every 4 hours which was helping was transitioned to 500 mg twice daily - tingling, dry mouth - takes around 8 AM and 6 PM  - emgality every 28 days.   - On through other providers: mirtazapine 45 mg, ambien 5 mg (10 mg tab not used in months), folic acid, Chlorthalidone 25 mg (held due to diamox improving BP), Has been taking hydroxychloriquine for 1.5 years now, Methotrexate since June 2023 and then increased 8/8/23   Denies bothersome side effects     Abortive:   Rizatriptan - no SE and works well sometimes with one, rests and then sometimes it is so intense will take a second dose   Denies bothersome side effects     Interval history as of 11/14/2023  - mom had hydrocephalus - and went blind in 70s  - vertigo episode before seeing me  - huge polyp in the endometrium found recently    - prior to this barely sees from right eye and  upcoming cataract surgery (in the past she had a cataract in left eye and new lens, had to extract it as she was seeing lines everywhere, one day woke up and couldn't see, fluid behind the eye too, had second one and now issues with right eye and going to do that cataract, has not driven in 3 years)    Headaches and migraines   3 times a week and rizatriptan helps them    Preventative:   - diamox 125 mg every 4 hours   - emgality once a month     Abortive:   Rizatriptan - no SE and works well   Denies bothersome side effects      Sleep  - sleep - very poor - untreated DIPTI - 1-2 hours lately, apmt coming up with sleep   The test results are from [facility].  The total time in bed (analysis time) was 498.4 minutes.  The patient had a total of 131 respiratory events made up of 18 obstructive apneas, 0 central apneas, 0 mixed apneas and 113 hypopneas resulting in a respiratory event index (BRANDI) of 16.1.  The lowest SpO2 recorded is 75%.  IMPRESSION:  1. Moderate obstructive sleep apnea with an BRANDI of 16.1 events per hour. Respiratory events were predominantly in supine position with supine BRANDI 18.8 versus nonsupine BRANDI of 3.5events per hour.  2.  Baseline oxygenation adequate.  Respiratory event related hypoxemia with oxygen saturation less than 90% for 11.2 minutes. Oxygen gamal of 75%.        Interval history as of 9/21/2023  - no significant new or concerning neurologic symptoms since last visit   - improvements  - prior to this barely sees from right eye and upcoming cataract surgery (in the past she had a cataract in left eye and new lens, had to extract it as she was seeing lines everywhere, one day woke up and couldn't see, fluid behind the eye too, had second one and now issues with right eye and going to do that cataract, has not driven in 3 years)  - wakes up with a lot of pressure   - sadly she had an ER visit in Jan and they incorrectly somehow report/say she was on medicaid/care when she was not and dealing  with bills on bills and so much stress trying to straighten it out, but then will schedule sleep study  - sleep study not scheduled yet  - hysteroscopy DC coming up 10/4/23    Headaches and migraines   2 headaches a week     Able to sleep longer and nap more     Preventative:   - emgality     - Trial of diamox 250 mg BID - some SE of blurred vision in the middle of the day and then  While I was out 8/14/23 Wrote in and my colleague decreased it to 125 mg BID and I wrote her instead to increase to 125 mg TID as I suspected wearing off effect  - takes 125 mg at 10 am, 4pm and 10 pm  Denies bothersome side effects     Other possible pertinent meds:  - PCP agreed with stopping chlorathiadone after reviewing her pressures in late July  Has been taking hydroxychloriquine for 1.5 years now   Methotrexate since June 2023 and then increased 8/8/23    Abortive:   rizatriptan - works better - helps - no SE   Denies bothersome side effects        Interval history as of 6/21/2023  -On 4/10/2023 she fell down a flight of stairs and was evaluated in ER,  no LOC, does not remember what happened when it happened and she was yelling and screaming down the stairs, head pain, neck pain and back pain and bruised all over, hit head going down, CT scan  - might have to have a hysterectomy   - had a cataract in left eye and new lens, had to extract it as she was seeing lines everywhere, one day woke up and couldn't see, fluid behind the eye too, and due second one and now issues with right eye and going to do that cataract, has not driven in 3 years     Headaches and migraines   She reports significant improvement on Emgality in migraines, she had less, not significantly less lately maybe due to ups and downs   She was started methotrexate and another med, migraines are worse, on it 3rd week    Preventative:   -After last visit resumed Emgality every 28 days which is working without side effects, never worse after   - On through  other providers: mirtazapine 45 mg, ambien 5 mg (not used in over 6 months)  bupropion, Chlorthalidone 25 mg (held due to diamox)    Abortive:   -Trial of rizatriptan in place of sumatriptan works better - not been taking since they told her not to take meds that may impact liver - worked better and often needs second dose, makes her feel a little off  Denies bothersome side effects    Sleep -never had a sleep study  -As of initial visit 8/19/20 -long-term insomnia on average 3 hours, denies problems falling asleep, wakes up approximately 3 times, has been told she snores and wakes up from sleep   - as of 6/21/2023: not much, 2 bad weeks with methtrexate pulls her down helping her sleep   On 2 sleep aids, a blood pressure pill, mom had DIPTI       Interval history as of 1/20/2023  - denies any new or concerning neurologic symptoms since last visit   - mom passed away in Nov 2022, its been hard in the past 4 years, lost only sister, motherin law, sister in law   - dx with sjogrens disease in Sept - gave her answers   - Dec eye lid lift, seeing eye doctor soon for sjogrens   - been struggling for the past 3 weeks with a kidney stone, UTI    Headaches and migraines   2-3 times a week      Preventative: stopped emgality on accident in Sept or Oct, when mom got worse she was on hospice at home and was there for 3 months and then she passed and she stayed there for a while   - On through other providers: mirtazapine, bupropion, Chlorthalidone, ambien 10 mg     Abortive: sumatriptan not helping much  - through other providers: ondansetron helps nausea   Denies bothersome side effects     Interval history as of 7/28/2022  - denies any new or concerning neurologic symptoms since last visit     Headaches and migraines   She reports improvement in frequency   4 mild a month  2 severe     Preventative:   - trial of emgality - 3/7/22, 5 months, next due 8/3/22  Denies bothersome side effects    Abortive:   - sumatriptan - hasnt  taken in a while, didn't realize she could   Denies bothersome side effects     Interval history as of 1/27/2022  - denies any new or concerning neurologic symptoms since last visit   - dealing with a lot of allergies     Headaches and migraines   - emgality helped initially after first dose and then pharmacy said she did not have refills, now getting about 4 migraines a month, daily mild migraines/headaches    Preventative: trial of emgality - got the first 2 shots in mid Sept  Abortive: sumatriptan 100 mg  Denies bothersome side effects     Interval history as of 8/27/2021  - denies any new or concerning neurologic symptoms since last visit   - She has been following with orthopedics, rehab, Fam med for other issues including chronic pain right ankle    Headaches and migraines   - a lot less migraines than before, they seem to come when stressed, about 4 migraines a month   Preventative:   - magnesium - not taking   Abortive: - sumatriptan 100 mg - often helps   Denies bothersome side effects       interval history as of 01/29/2021:   - PCP started on BP med  - a lot of stress lately, death of sister, death of mother in law,  MI in sept  - PCP started xanax    Headaches and migraines   Some improvement with decreased stress, 2-3 migraines a month     Preventive: didn't start ajovy, off topiramate, taking magnesium    - taking amitriptyline 10 mg just occasionally and having SE   Abortive: sumatriptan and zofran help        Headaches started at what age? 19s years old  How often do the headaches occur?   - as of 8/19/2020: chronic daily headaches and Migraines 3-4 days per week for the past year or so  What time of the day do the headaches start?  Wakes up with it   How long do the headaches last? 2-4 hours with treatment, without all day   Are you ever headache free? Yes     Aura? without aura     Last eye exam:   Cataract surgery 2018 - has been about a year since last exam as of 8/19/2020      Where is  your headache located and pain quality?   - left frontal, retro-orbital - stabbing, throbbing, sharp  - sometimes to the back   What is the intensity of pain? Average: 4-5/10, worst 10/10  Associated symptoms:   [x] Nausea       [x] Vomiting          [x] Stiff or sore neck   [x] Problems with concentration  [x] Photophobia     [x]Phonophobia      [] Osmophobia  [x] Blurred vision   [x] Prefer quiet, dark room  [x] Light-headed or dizzy      [] Tinnitus   [] Hands or feet tingle or feel numb/paresthesias       [] Red ear      [x] Ptosis - maybe      [] Facial droop  [] Lacrimation  [] Nasal congestion/rhinorrhea   [] Flushing of face  [] Change in pupil size     Things that make the headache worse? No specific movements, any movement      Headache triggers:    Menses in the past      Have you seen someone else for headaches or pain? Yes, PCP  Have you had trigger point injection performed and how often? No  Have you had Botox injection performed and how often? No   Have you had epidural injections or transforaminal injections performed? No  Are you current pregnant or planning on getting pregnant? Menopause at age 41  Have you ever had any Brain imaging? Yes      What medications do you take or have you taken for your headaches?   ABORTIVE:    OTC medications have been ineffective      Sumatriptan 50 mg - dry mouth, maybe a little nausea   Ondansetron - helps      PREVENTIVE:      Magnesium 400 mg  Amitriptyline 10 mg (started 8/14/20) - sometimes forgets      For BP: chlorthalidone 25 mg   For sleep:  Ambien 10 mg prn, mirtazapine 45 mg   For mood: not taking xanax     Past:  Topiramate 50 mg b.i.d. (started 5/2020, increased from 25 mg BID 8/14/20) - stopped         LIFESTYLE  Sleep   - averages: 3 hours  Problems falling asleep?:   no  Problems staying asleep?:  Yes, 3 times   - snores  - never had a sleep study      Water: 32 oz or less per day  Caffeine: 0 per day     Mood: anxiety, depression  - sees a  psychiatrist - gets mirtazapine   - counseling in the past      The following portions of the patient's history were reviewed and updated as appropriate: allergies, current medications, past family history, past medical history, past social history, past surgical history and problem list.     Pertinent family history:  Family history of headaches:  migraine headaches in mother and sister  Any family history of aneurysms - No     Pertinent social history:  Work: no  Lives with       Illicit Drugs: denies  Alcohol/tobacco: Denies alcohol use, Denies tobacco use     Pertinent lab results:   See EMR for recent labs  7/23/20: CMP unremarkable, CBC unremarkable except for WBC 10.9  10/02/2019 vitamin-D 33, TSH 1.4     EKG 7/23/20: normal sinus rhythm, normal ECG, rate 80,       Imaging:   I have personally reviewed imaging and radiology read   -Noncontrast head CT 4/10/2023:  No acute intracranial abnormality. *we discussed as of my retrospective review 9/21/23, the nonspecific signs that I also see on MRI brain 2019 as below  - CTA head and neck with without contrast 07/23/2020:  1. No evidence of acute intracranial hemorrhage.  2. No evidence of hemodynamic significant stenosis, aneurysm or dissection.  - MRI brain with without contrast 08/01/2019:  A few nonspecific white matter lesions.  No acute infarction, intracranial hemorrhage or mass.*As of my retrospective review 2/22/2024 she has a partially empty sella that is nearly empty, bilateral optic nerve sheath prominence and tortuosity, tighter ventricles than expected for age 58, cerebellar tonsils overlie the foramen magnum with tight junction right greater than left         Objective     There were no vitals taken for this visit.    Objective:     Exam limited by Video  Physical Exam:                                                                 Vitals:            Constitutional:    There were no vitals taken for this visit.  BP Readings from Last  3 Encounters:   08/22/24 140/76   08/21/24 130/71   08/12/24 136/76     Pulse Readings from Last 3 Encounters:   08/22/24 71   08/21/24 71   08/12/24 84         Well developed, well nourished, No dysmorphic features.         Normocephalic atraumatic.     Normal behavior and appropriate affect        Able to answer questions appropriately, provide history of recent events   Normal language and spontaneous speech.  facial expression symmetric  symmetric bulk throughout. no atrophy, fasciculations or significant abnormal movements noted during our visit from observation.        Review of Systems:   ROS obtained by medical assistant and reviewed, but if any symptoms listed below say negative, does not mean patient has not had this symptom since last visit, please see HPI for details of symptoms discussed this visit.  Regarding any abnormal or positive findings in ROS that are not neurologic related, patient instructed to address these issues with PCP or go to the ER if they are severe.    Review of Systems   Constitutional:  Negative for appetite change, fatigue and fever.   HENT: Negative.  Negative for hearing loss, tinnitus, trouble swallowing and voice change.    Eyes: Negative.  Negative for photophobia, pain and visual disturbance.   Respiratory: Negative.  Negative for shortness of breath.    Cardiovascular: Negative.  Negative for palpitations.   Gastrointestinal: Negative.  Negative for nausea and vomiting.   Endocrine: Negative.  Negative for cold intolerance.   Genitourinary: Negative.  Negative for dysuria, frequency and urgency.   Musculoskeletal:  Negative for back pain, gait problem, myalgias, neck pain and neck stiffness.   Skin: Negative.  Negative for rash.   Allergic/Immunologic: Negative.    Neurological:  Positive for headaches (down to one migraine a week). Negative for dizziness, tremors, seizures, syncope, facial asymmetry, speech difficulty, weakness, light-headedness and numbness.    Hematological: Negative.  Does not bruise/bleed easily.   Psychiatric/Behavioral: Negative.  Negative for confusion, hallucinations and sleep disturbance         I have spent 31 minutes with Patient today in which greater than 50% of this time was spent in counseling/coordination of care regarding Risks and benefits of tx options, Instructions for management, Patient and family education, Importance of tx compliance, Risk factor reductions, Impressions, Counseling / Coordination of care, Documenting in the medical record, Obtaining or reviewing history  , and Communicating with other healthcare professionals . I also spent 10 minutes non face to face for this patient the same day.           Visit Time  Total Visit Duration: 41

## 2024-09-03 ENCOUNTER — APPOINTMENT (OUTPATIENT)
Dept: LAB | Facility: HOSPITAL | Age: 59
End: 2024-09-03
Payer: MEDICARE

## 2024-09-03 DIAGNOSIS — D53.9 NUTRITIONAL ANEMIA: ICD-10-CM

## 2024-09-03 DIAGNOSIS — G25.81 RESTLESS LEG SYNDROME: ICD-10-CM

## 2024-09-03 DIAGNOSIS — M35.00 SICCA SYNDROME (HCC): ICD-10-CM

## 2024-09-03 DIAGNOSIS — E61.1 IRON DEFICIENCY: ICD-10-CM

## 2024-09-03 LAB
ALBUMIN SERPL BCG-MCNC: 4 G/DL (ref 3.5–5)
ALP SERPL-CCNC: 90 U/L (ref 34–104)
ALT SERPL W P-5'-P-CCNC: 24 U/L (ref 7–52)
ANION GAP SERPL CALCULATED.3IONS-SCNC: 5 MMOL/L (ref 4–13)
AST SERPL W P-5'-P-CCNC: 20 U/L (ref 13–39)
BASOPHILS # BLD AUTO: 0.02 THOUSANDS/ÂΜL (ref 0–0.1)
BASOPHILS NFR BLD AUTO: 0 % (ref 0–1)
BILIRUB SERPL-MCNC: 0.53 MG/DL (ref 0.2–1)
BUN SERPL-MCNC: 14 MG/DL (ref 5–25)
CALCIUM SERPL-MCNC: 9.4 MG/DL (ref 8.4–10.2)
CHLORIDE SERPL-SCNC: 108 MMOL/L (ref 96–108)
CHOLEST SERPL-MCNC: 165 MG/DL
CO2 SERPL-SCNC: 30 MMOL/L (ref 21–32)
CREAT SERPL-MCNC: 0.92 MG/DL (ref 0.6–1.3)
CRP SERPL QL: 2.4 MG/L
EOSINOPHIL # BLD AUTO: 0.07 THOUSAND/ÂΜL (ref 0–0.61)
EOSINOPHIL NFR BLD AUTO: 1 % (ref 0–6)
ERYTHROCYTE [DISTWIDTH] IN BLOOD BY AUTOMATED COUNT: 14 % (ref 11.6–15.1)
ERYTHROCYTE [SEDIMENTATION RATE] IN BLOOD: 18 MM/HOUR (ref 0–29)
FOLATE SERPL-MCNC: >22.3 NG/ML
GFR SERPL CREATININE-BSD FRML MDRD: 68 ML/MIN/1.73SQ M
GLUCOSE P FAST SERPL-MCNC: 93 MG/DL (ref 65–99)
HCT VFR BLD AUTO: 40.8 % (ref 34.8–46.1)
HDLC SERPL-MCNC: 43 MG/DL
HGB BLD-MCNC: 12.9 G/DL (ref 11.5–15.4)
IMM GRANULOCYTES # BLD AUTO: 0 THOUSAND/UL (ref 0–0.2)
IMM GRANULOCYTES NFR BLD AUTO: 0 % (ref 0–2)
LDLC SERPL CALC-MCNC: 106 MG/DL (ref 0–100)
LYMPHOCYTES # BLD AUTO: 1.68 THOUSANDS/ÂΜL (ref 0.6–4.47)
LYMPHOCYTES NFR BLD AUTO: 34 % (ref 14–44)
MCH RBC QN AUTO: 31 PG (ref 26.8–34.3)
MCHC RBC AUTO-ENTMCNC: 31.6 G/DL (ref 31.4–37.4)
MCV RBC AUTO: 98 FL (ref 82–98)
MONOCYTES # BLD AUTO: 0.36 THOUSAND/ÂΜL (ref 0.17–1.22)
MONOCYTES NFR BLD AUTO: 7 % (ref 4–12)
NEUTROPHILS # BLD AUTO: 2.77 THOUSANDS/ÂΜL (ref 1.85–7.62)
NEUTS SEG NFR BLD AUTO: 58 % (ref 43–75)
NONHDLC SERPL-MCNC: 122 MG/DL
NRBC BLD AUTO-RTO: 0 /100 WBCS
PLATELET # BLD AUTO: 219 THOUSANDS/UL (ref 149–390)
PMV BLD AUTO: 10.2 FL (ref 8.9–12.7)
POTASSIUM SERPL-SCNC: 3.7 MMOL/L (ref 3.5–5.3)
PROT SERPL-MCNC: 6.8 G/DL (ref 6.4–8.4)
RBC # BLD AUTO: 4.16 MILLION/UL (ref 3.81–5.12)
SODIUM SERPL-SCNC: 143 MMOL/L (ref 135–147)
TRIGL SERPL-MCNC: 82 MG/DL
VIT B12 SERPL-MCNC: 1012 PG/ML (ref 180–914)
WBC # BLD AUTO: 4.9 THOUSAND/UL (ref 4.31–10.16)

## 2024-09-03 PROCEDURE — 85652 RBC SED RATE AUTOMATED: CPT

## 2024-09-03 PROCEDURE — 80061 LIPID PANEL: CPT

## 2024-09-03 PROCEDURE — 82746 ASSAY OF FOLIC ACID SERUM: CPT

## 2024-09-03 PROCEDURE — 86140 C-REACTIVE PROTEIN: CPT

## 2024-09-03 PROCEDURE — 36415 COLL VENOUS BLD VENIPUNCTURE: CPT

## 2024-09-03 PROCEDURE — 85025 COMPLETE CBC W/AUTO DIFF WBC: CPT

## 2024-09-03 PROCEDURE — 80053 COMPREHEN METABOLIC PANEL: CPT

## 2024-09-03 PROCEDURE — 82607 VITAMIN B-12: CPT

## 2024-09-12 ENCOUNTER — APPOINTMENT (OUTPATIENT)
Dept: LAB | Facility: HOSPITAL | Age: 59
End: 2024-09-12
Payer: MEDICARE

## 2024-09-12 ENCOUNTER — OFFICE VISIT (OUTPATIENT)
Dept: HEMATOLOGY ONCOLOGY | Facility: CLINIC | Age: 59
End: 2024-09-12
Payer: MEDICARE

## 2024-09-12 ENCOUNTER — TELEPHONE (OUTPATIENT)
Dept: SURGICAL ONCOLOGY | Facility: CLINIC | Age: 59
End: 2024-09-12

## 2024-09-12 VITALS
HEIGHT: 64 IN | TEMPERATURE: 98.3 F | WEIGHT: 166 LBS | DIASTOLIC BLOOD PRESSURE: 88 MMHG | BODY MASS INDEX: 28.34 KG/M2 | OXYGEN SATURATION: 96 % | SYSTOLIC BLOOD PRESSURE: 128 MMHG | HEART RATE: 82 BPM | RESPIRATION RATE: 15 BRPM

## 2024-09-12 DIAGNOSIS — E53.8 B12 DEFICIENCY: ICD-10-CM

## 2024-09-12 DIAGNOSIS — E61.1 IRON DEFICIENCY: ICD-10-CM

## 2024-09-12 DIAGNOSIS — E61.1 IRON DEFICIENCY: Primary | ICD-10-CM

## 2024-09-12 DIAGNOSIS — G25.81 RESTLESS LEG SYNDROME: ICD-10-CM

## 2024-09-12 LAB
FERRITIN SERPL-MCNC: 273 NG/ML (ref 11–307)
IRON SATN MFR SERPL: 32 % (ref 15–50)
IRON SERPL-MCNC: 80 UG/DL (ref 50–212)
TIBC SERPL-MCNC: 250 UG/DL (ref 250–450)
UIBC SERPL-MCNC: 170 UG/DL (ref 155–355)

## 2024-09-12 PROCEDURE — 36415 COLL VENOUS BLD VENIPUNCTURE: CPT

## 2024-09-12 PROCEDURE — 99214 OFFICE O/P EST MOD 30 MIN: CPT | Performed by: PHYSICIAN ASSISTANT

## 2024-09-12 PROCEDURE — 83540 ASSAY OF IRON: CPT

## 2024-09-12 PROCEDURE — 83550 IRON BINDING TEST: CPT

## 2024-09-12 PROCEDURE — 82728 ASSAY OF FERRITIN: CPT

## 2024-09-12 NOTE — TELEPHONE ENCOUNTER
Called patient if she is able to come in earlier for appointment today 9/12/24 with Prabha Weber PA-C, patient is able to come in earlier and may come around 10-10:15AM.
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
## 2024-09-17 ENCOUNTER — HOSPITAL ENCOUNTER (OUTPATIENT)
Dept: NUCLEAR MEDICINE | Facility: HOSPITAL | Age: 59
Discharge: HOME | End: 2024-09-17
Payer: MEDICARE

## 2024-09-17 DIAGNOSIS — R11.0 NAUSEA: ICD-10-CM

## 2024-09-17 PROCEDURE — 78264 GASTRIC EMPTYING IMG STUDY: CPT

## 2024-09-17 PROCEDURE — A9541 TC99M SULFUR COLLOID: HCPCS

## 2024-09-19 ENCOUNTER — TELEPHONE (OUTPATIENT)
Dept: HEMATOLOGY ONCOLOGY | Facility: CLINIC | Age: 59
End: 2024-09-19

## 2024-09-19 NOTE — TELEPHONE ENCOUNTER
Spoke with patient to make her aware of the following from [place],   You had a very good response to the IV iron.  Your ferritin level is 275.  It is not recommended to exceed ferritin levels of 300 for treatment of restless leg syndrome.  I do not feel strongly you would benefit from any additional IV iron at this time given your current iron panel.  I would recommend that you follow-up with your PCP and sleep medicine physician for further management of your restless leg syndrome as I do not think that if his symptoms are still present but they are related to your iron levels.     Please let me know if you have any additional questions or concerns.           She verbalized understanding and agreed to plan  Appointment cancelled with Prabha for January  Will FU with office PRN

## 2024-10-01 ENCOUNTER — OFFICE VISIT (OUTPATIENT)
Age: 59
End: 2024-10-01
Payer: MEDICARE

## 2024-10-01 ENCOUNTER — COSMETIC (OUTPATIENT)
Age: 59
End: 2024-10-01

## 2024-10-01 VITALS — TEMPERATURE: 98.4 F | HEIGHT: 64 IN | WEIGHT: 161.2 LBS | BODY MASS INDEX: 27.52 KG/M2

## 2024-10-01 DIAGNOSIS — L82.1 SEBORRHEIC KERATOSIS: Primary | ICD-10-CM

## 2024-10-01 DIAGNOSIS — L21.9 SEBORRHEIC DERMATITIS: ICD-10-CM

## 2024-10-01 DIAGNOSIS — D18.01 CHERRY ANGIOMA: ICD-10-CM

## 2024-10-01 DIAGNOSIS — L82.0 LICHENOID KERATOSIS: ICD-10-CM

## 2024-10-01 DIAGNOSIS — D22.9 MULTIPLE BENIGN MELANOCYTIC NEVI: ICD-10-CM

## 2024-10-01 DIAGNOSIS — L91.8 SKIN TAGS, MULTIPLE ACQUIRED: Primary | ICD-10-CM

## 2024-10-01 DIAGNOSIS — L81.4 LENTIGO: ICD-10-CM

## 2024-10-01 PROCEDURE — SKNTGDERM SKIN TAG DERM ADD ON: Performed by: DERMATOLOGY

## 2024-10-01 PROCEDURE — 99204 OFFICE O/P NEW MOD 45 MIN: CPT | Performed by: DERMATOLOGY

## 2024-10-01 RX ORDER — KETOCONAZOLE 20 MG/G
CREAM TOPICAL DAILY
Qty: 30 G | Refills: 2 | Status: SHIPPED | OUTPATIENT
Start: 2024-10-01

## 2024-10-01 NOTE — PROGRESS NOTES
"[facility] Note     Patient Name: Baylee Villafuerte  Encounter Date: 10/1/2024     Have you been cared for by a [facility] Dermatologist in the last 3 years and, if so, which description applies to you?    NO.   I am considered a \"new\" patient and must complete all patient intake questions. I am FEMALE/of child-bearing potential.    REVIEW OF SYSTEMS:  Have you recently had or currently have any of the following? Recent fever or chills? No  Any non-healing wound? No  Are you pregnant or planning to become pregnant? No  Are you currently or planning to be nursing or breast feeding? No   PAST MEDICAL HISTORY:  Have you personally ever had or currently have any of the following?  If \"YES,\" then please provide more detail. Skin cancer (such as Melanoma, Basal Cell Carcinoma, Squamous Cell Carcinoma?  No  Tuberculosis, HIV/AIDS, Hepatitis B or C: No  Radiation Treatment No   HISTORY OF IMMUNOSUPPRESSION:   Do you have a history of any of the following:  Systemic Immunosuppression such as Diabetes, Biologic or Immunotherapy, Chemotherapy, Organ Transplantation, Bone Marrow Transplantation or Prednsione?  YES, Methotrexate 2.5 mg, Sjogren, Rheumatoid arthritis      Answering \"YES\" requires the addition of the dotphrase \"IMMUNOSUPPRESSED\" as the first diagnosis of the patient's visit.   FAMILY HISTORY:  Any \"first degree relatives\" (parent, brother, sister, or child) with the following?    Skin Cancer, Pancreatic or Other Cancer? No   PATIENT EXPERIENCE:    Do you want the Dermatologist to perform a COMPLETE skin exam today including a clinical examination under the \"bra and underwear\" areas?  Yes  If necessary, do we have your permission to call and leave a detailed message on your Preferred Phone number that includes your specific medical information?  Yes      Allergies   Allergen Reactions    Megavite Fruits & Veggies [Anti-Oxidant] Rash     Rash , hives- strawberries, cantoulope, honeydew, " watermelon, oranges, bananas    Other Hives     Seasonal     Codeine Hives, Itching, GI Intolerance and Rash     stomach cramps  rash    Nuts - Food Allergy Rash     Hazelnuts, peanuts, walnuts, sesame seeds, pumpkin seeds- hives      Current Outpatient Medications:     acyclovir (ZOVIRAX) 5 % ointment, APPLY TOPICALLY IF NEEDED (FOR COLD SORES), Disp: 30 g, Rfl: 0    atorvastatin (LIPITOR) 10 mg tablet, TAKE 1 TABLET BY MOUTH EVERY DAY, Disp: 90 tablet, Rfl: 1    Auvi-Q 0.3 MG/0.3ML SOAJ, as needed Epi pen, Disp: , Rfl:     carboxymethylcellulose (REFRESH PLUS) 0.5 % SOLN, Administer 2 drops to both eyes 2 (two) times a day as needed for dry eyes  , Disp: , Rfl:     DULoxetine (CYMBALTA) 30 mg delayed release capsule, Take 30 mg by mouth daily, Disp: , Rfl:     ergocalciferol (VITAMIN D2) 50,000 units, TAKE 1 CAPSULE BY MOUTH ONE TIME PER WEEK, Disp: 12 capsule, Rfl: 0    folic acid (FOLVITE) 1 mg tablet, Take 2,000 mcg by mouth 2 (two) times a day, Disp: , Rfl:     Galcanezumab-gnlm 120 MG/ML SOAJ, Inject 120 mg under the skin every 28 days (3 month supply so she doesn't have to wait for the prescription delayed every month), Disp: 3 mL, Rfl: 4    hydroxychloroquine (PLAQUENIL) 200 mg tablet, Take 200 mg by mouth 2 (two) times a day with meals, Disp: , Rfl:     methocarbamol (Robaxin-750) 750 mg tablet, Take 1 tablet (750 mg total) by mouth 3 (three) times a day as needed for muscle spasms, Disp: 30 tablet, Rfl: 3    methotrexate 2.5 MG tablet, Take 2.5 mg by mouth once a week 4 tablets on Monday and 2 tablets on Tuesday, Disp: , Rfl:     mirtazapine (REMERON) 45 MG tablet, TAKE 1 TABLET (45 MG TOTAL) BY MOUTH AS NEEDED AT BEDTIME, Disp: 100 tablet, Rfl: 1    ondansetron (ZOFRAN-ODT) 4 mg disintegrating tablet, Take 1 tablet (4 mg total) by mouth every 8 (eight) hours as needed for nausea or vomiting, Disp: 60 tablet, Rfl: 6    RABEprazole (ACIPHEX) 20 MG tablet, TAKE 2 TABLETS BY MOUTH 2 TIMES A DAY., Disp: 360  "tablet, Rfl: 1    rimegepant sulfate (Nurtec) 75 mg TBDP, Take one NURTEC 75 mg at onset on (or under) tongue daily as needed for migraine, Disp: 16 tablet, Rfl: 11    rizatriptan (MAXALT) 10 mg tablet, TAKE 1 TABLET (10 MG TOTAL) BY MOUTH ONCE AS NEEDED FOR MIGRAINE MAY REPEAT IN 2 HOURS IF NEEDED., Disp: 9 tablet, Rfl: 5    Xiidra 5 % op solution, Administer to both eyes 2 (two) times a day, Disp: , Rfl:     estradiol (ESTRACE) 0.1 mg/g vaginal cream, Insert 2 g into the vagina daily Use daily for 2 weeks, then decrease to 2-3 times per week until no longer necessary (Patient not taking: Reported on 9/12/2024), Disp: 42 g, Rfl: 3    predniSONE 5 mg tablet, 10 mg for 1 week- 5 mg for 1 week then 2.5 mg for 1 week (Patient not taking: Reported on 9/12/2024), Disp: , Rfl:     VIT B12-METHIONINE-INOS-CHOL IM, , Disp: , Rfl:           Whom besides the patient is providing clinical information about today's encounter?   NO ADDITIONAL HISTORIAN (patient alone provided history)    Physical Exam and Assessment/Plan by Diagnosis:    Patient is present for skin check, \\    LICHENOID KERATOSIS (\"BENIGN LICHENOID KERATOSIS\")    Physical Exam:  Anatomic Location Affected:  right breast   Morphological Description:  pink flat top scaly papule   Pertinent Positives:  Pertinent Negatives:    Additional History of Present Condition:  present on exam     Assessment and Plan:  Based on a thorough discussion of this condition and the management approach to it (including a comprehensive discussion of the known risks, side effects and potential benefits of treatment), the patient (family) agrees to implement the following specific plan:  Reassured benign     What is lichenoid keratosis?  Lichenoid keratosis is usually a small, solitary, inflamed macule or thin pigmented plaque. Multiple eruptive lichenoid keratoses in sun-exposed sites are also described. Their colour varies from an initial reddish brown to a greyish purple/brown as " the lesion resolves several weeks or months later.    Lichenoid keratosis is also known as benign lichenoid keratosis, solitary lichen planus, lichen planus-like keratosis and involuting lichenoid plaque. They are one of the causes of atypical solar lentigo.    Who gets lichenoid keratosis?  Lichenoid keratosis generally develops in fair-skinned patients aged 30-80 years. It is twice as common in females as than males. It is most commonly seen in Caucasians and rarely affects Asians,  Americans or Hispanics.    What causes lichenoid keratosis?  Lichenoid keratosis is an inflammatory reaction arising in a regressing existing solar lentigo or seborrhoeic keratosis. It is not known what triggers the reaction, but triggers include minor trauma such as friction, drugs, dermatitis, and sun exposure.    How is lichenoid keratosis diagnosed?  Lichenoid keratosis is diagnosed by its clinical and dermoscopic appearance, which reveals uniform clusters of grey dots and, depending on the stage of the lesion, may show signs of an original pre-existing lentigo or seborrhoeic keratosis. In time, signs of the original lesion disappear. Later on the grey dots also disappear, as the lesion resolves to reveal normal skin.  Because clinical examination and dermatoscopy may not be able to differentiate between lichenoid keratosis and other solitary erythematous lesions that could be melanocytic, non-melanocytic benign, malignant or inflammatory, a punch or shave skin biopsy may be necessary.    Histopathology of resembles that of lichen planus or lichenoid drug eruption, with some slight differences. Remnants of the original solar lentigo or seborrhoeic keratosis may be evident.    What are the clinical features of lichenoid keratosis?    Classic, bullous, or atypical  Acute rapidly developing lesion (present for <3 months)  Erythematous or pinkish papule or plaque  Dermoscopy may show remnants of pigment network, subtle blotches  of brown colour, clusters of grey dots plus dotted, irregular linear and other shaped telangiectatic blood vessels  Early or interface subtype  Subacute lesions present for 3 months to one year  Erythematous to dusky-red or hyper-pigmented brown lesion  Depending on the age of lesion, dermoscopy may show features of a solar lentigo or flat seborrhoeic keratosis with moth-eaten borders, fingerprinting, milia-like cysts, comedo-like openings, plus small foci of melanophages (grey dots).  Late regressed or atrophic subtype  Lesions have been present for more than one year  May be violaceous (violet-coloured) papules or irregularly distributed lesions with shades of brown or grey  Other features of lichenoid keratosis are:  A solitary lesion is present in 90% of cases of lichenoid keratosis, with other patients presenting with few to many lesions.  It is most commonly found on the upper trunk, followed by the distal upper extremities, and less commonly on the head and neck.  Size ranges from a few millimetres to one centimetre or more in size.  The skin surface may be smooth, scaly or warty.  The lesion is often symptomless or it may be itchy or have a mild stinging sensation.    What is the management of lichenoid keratosis?  Lichenoid keratosis is harmless and resolves spontaneously. If there is any doubt about the diagnosis, dermatoscopic digital images can be taken and used in follow-up a few months later.  Lichenoid keratosis can be removed if desired by liquid nitrogen, electrosurgery or curettage.  Multiple eruptive lichenoid keratoses may be effectively treated with the oral retinoid, acitretin.  To date there have been no reports of lichenoid keratosis turning into malignant skin tumours.     SEBORRHEIC DERMATITIS    Physical Exam:  Anatomic Location Affected:  forehead   Morphological Description:  pink scaly patches  Pertinent Positives:  Pertinent Negatives:    Additional History of Present Condition:   "patient notes that it started after she had shingles    Assessment and Plan:  Based on a thorough discussion of this condition and the management approach to it (including a comprehensive discussion of the known risks, side effects and potential benefits of treatment), the patient (family) agrees to implement the following specific plan:  Reassured benign   Ketoconazole cream daily for 1-2 weeks for flares      Seborrheic Dermatitis   Seborrheic dermatitis is a common, chronic or relapsing form of eczema/dermatitis that mainly affects the sebaceous, gland-rich regions of the scalp, face, and trunk.  There are infantile and adult forms of seborrhoeic dermatitis. It is sometimes associated with psoriasis and, in that clinical scenario, may be referred to as \"sebo-psoriasis.\"  Seborrheic dermatitis is also known as \"seborrheic eczema.\"  Dandruff (also called \"pityriasis capitis\") is an uninflamed form of seborrhoeic dermatitis. Dandruff presents as bran-like scaly patches scattered within hair-bearing areas of the scalp.  In an infant, this condition may be referred to as \"cradle cap.\"  The cause of seborrheic dermatitis is not completely understood. It is associated with proliferation of various species of the skin commensal Malassezia, in its yeast (non-pathogenic) form. Its metabolites (such as the fatty acids oleic acid, malssezin, and indole-3-carbaldehyde) may cause an inflammatory reaction. Differences in skin barrier lipid content and function may account for individual presentations.    Infantile Seborrheic Dermatitis  Infantile seborrheic dermatitis affects babies under the age of 3 months and usually resolves by 6-12 months of age.  Infantile seborrheic dermatitis causes \"cradle cap\" (diffuse, greasy scaling on scalp). The rash may spread to affect armpit and groin folds (a type of \"napkin dermatitis\").  There may be associated salmon-pink colored patches that may flake or peel.  The rash in this case is " "usually not especially itchy, so the baby often appears undisturbed by the rash, even when more generalized.    Adult Seborrheic Dermatitis  Adult seborrheic dermatitis tends to begin in late adolescence; prevalence is greatest in young adults and in the elderly. It is more common in males than in females.    The following factors are sometimes associated with severe adult seborrheic dermatitis:  Oily skin  Familial tendency to seborrhoeic dermatitis or a family history of psoriasis  Immunosuppression: organ transplant recipient, human immunodeficiency virus (HIV) infection and patients with lymphoma  Neurological and psychiatric diseases: Parkinson disease, tardive dyskinesia, depression, epilepsy, facial nerve palsy, spinal cord injury and congenital disorders such as Down syndrome  Treatment for psoriasis with psoralen and ultraviolet A (PUVA) therapy  Lack of sleep  Stressful events.    In adults, seborrheic dermatitis may typically affect the scalp, face (creases around the nose, behind ears, within eyebrows) and upper trunk. Typical clinical features include:  Winter flares, improving in summer following sun exposure  Minimal itch most of the time  Combination oily and dry mid-facial skin  Ill-defined localized scaly patches or diffuse scale in the scalp  Blepharitis; scaly red eyelid margins  [place]-pink, thin, scaly, and ill-defined plaques in skin folds on both sides of the face  Petal or ring-shaped flaky patches on hair-line and on anterior chest  Rash in armpits, under the breasts, in the groin folds and genital creases  Superficial folliculitis (inflamed hair follicles) on cheeks and upper trunk    Seborrheic dermatitis is diagnosed by its clinical appearance and behavior. Skin biopsy may be helpful but is rarely necessary to make this diagnosis.     MELANOCYTIC NEVI (\"Moles\")    Physical Exam:  Anatomic Location Affected:   Mostly on sun-exposed areas of the trunk and extremities  Morphological " "Description:  Scattered, 1-4mm round to ovoid, symmetrical-appearing, even bordered, skin colored to dark brown macules/papules, mostly in sun-exposed areas  Pertinent Positives:  Pertinent Negatives:    Additional History of Present Condition:      Assessment and Plan:  Based on a thorough discussion of this condition and the management approach to it (including a comprehensive discussion of the known risks, side effects and potential benefits of treatment), the patient (family) agrees to implement the following specific plan:  When outside we recommend using a wide brim hat, sunglasses, long sleeve and pants, sunscreen with SPF 30+ with reapplication every 2 hours, or SPF specific clothing   Benign, reassured  Annual skin check     Melanocytic Nevi  Melanocytic nevi (\"moles\") are tan or brown, raised or flat areas of the skin which have an increased number of melanocytes. Melanocytes are the cells in our body which make pigment and account for skin color.    Some moles are present at birth (I.e., \"congenital nevi\"), while others come up later in life (i.e., \"acquired nevi\").  The sun can stimulate the body to make more moles.  Sunburns are not the only thing that triggers more moles.  Chronic sun exposure can do it too.     Clinically distinguishing a healthy mole from melanoma may be difficult, even for experienced dermatologists. The \"ABCDE's\" of moles have been suggested as a means of helping to alert a person to a suspicious mole and the possible increased risk of melanoma.  The suggestions for raising alert are as follows:    Asymmetry: Healthy moles tend to be symmetric, while melanomas are often asymmetric.  Asymmetry means if you draw a line through the mole, the two halves do not match in color, size, shape, or surface texture. Asymmetry can be a result of rapid enlargement of a mole, the development of a raised area on a previously flat lesion, scaling, ulceration, bleeding or scabbing within the mole.  " "Any mole that starts to demonstrate \"asymmetry\" should be examined promptly by a board certified dermatologist.     Border: Healthy moles tend to have discrete, even borders.  The border of a melanoma often blends into the normal skin and does not sharply delineate the mole from normal skin.  Any mole that starts to demonstrate \"uneven borders\" should be examined promptly by a board certified dermatologist.     Color: Healthy moles tend to be one color throughout.  Melanomas tend to be made up of different colors ranging from dark black, blue, white, or red.  Any mole that demonstrates a color change should be examined promptly by a board certified dermatologist.     Diameter: Healthy moles tend to be smaller than 0.6 cm in size; an exception are \"congenital nevi\" that can be larger.  Melanomas tend to grow and can often be greater than 0.6 cm (1/4 of an inch, or the size of a pencil eraser). This is only a guideline, and many normal moles may be larger than 0.6 cm without being unhealthy.  Any mole that starts to change in size (small to bigger or bigger to smaller) should be examined promptly by a board certified dermatologist.     Evolving: Healthy moles tend to \"stay the same.\"  Melanomas may often show signs of change or evolution such as a change in size, shape, color, or elevation.  Any mole that starts to itch, bleed, crust, burn, hurt, or ulcerate or demonstrate a change or evolution should be examined promptly by a board certified dermatologist.        LENTIGO    Physical Exam:  Anatomic Location Affected:  trunk, arms  Morphological Description:  Light brown macules  Pertinent Positives:  Pertinent Negatives:    Additional History of Present Condition:      Assessment and Plan:  Based on a thorough discussion of this condition and the management approach to it (including a comprehensive discussion of the known risks, side effects and potential benefits of treatment), the patient (family) agrees to " implement the following specific plan:  When outside we recommend using a wide brim hat, sunglasses, long sleeve and pants, sunscreen with SPF 30+ with reapplication every 2 hours, or SPF specific clothing       What is a lentigo?  A lentigo is a pigmented flat or slightly raised lesion with a clearly defined edge. Unlike an ephelis (freckle), it does not fade in the winter months. There are several kinds of lentigo.  The name lentigo originally referred to its appearance resembling a small lentil. The plural of lentigo is lentigines, although “lentigos” is also in common use.    Who gets lentigines?  Lentigines can affect males and females of all ages and races. Solar lentigines are especially prevalent in fair skinned adults. Lentigines associated with syndromes are present at birth or arise during childhood.    What causes lentigines?  Common forms of lentigo are due to exposure to ultraviolet radiation:  Sun damage including sunburn   Indoor tanning   Phototherapy, especially photochemotherapy (PUVA)    Ionizing radiation, eg radiation therapy, can also cause lentigines.  Several familial syndromes associated with widespread lentigines originate from mutations in Iftikhar-MAP kinase, mTOR signaling and PTEN pathways.    What is the treatment for lentigines?  Most lentigines are left alone. Attempts to lighten them may not be successful. The following approaches are used:  SPF 50+ broad-spectrum sunscreen   Hydroquinone bleaching cream   Alpha hydroxy acids   Vitamin C   Retinoids   Azelaic acid   Chemical peels  Individual lesions can be permanently removed using:  Cryotherapy   Intense pulsed light   Pigment lasers    How can lentigines be prevented?  Lentigines associated with exposure ultraviolet radiation can be prevented by very careful sun protection. Clothing is more successful at preventing new lentigines than are sunscreens.    What is the outlook for lentigines?  Lentigines usually persist. They may  "increase in number with age and sun exposure. Some in sun-protected sites may fade and disappear.    VALDES ANGIOMAS    Physical Exam:  Anatomic Location Affected:  trunk  Morphological Description:  Scattered cherry red, 1-4 mm papules.  Pertinent Positives:  Pertinent Negatives:    Additional History of Present Condition:      Assessment and Plan:  Based on a thorough discussion of this condition and the management approach to it (including a comprehensive discussion of the known risks, side effects and potential benefits of treatment), the patient (family) agrees to implement the following specific plan:  Monitor for changes  Benign, reassured  Advised to schedule an appt at [facility] for removal        Assessment and Plan:    Cherry angioma, also known as Jasso de Sushil spots, are benign vascular skin lesions. A \"cherry angioma\" is a firm red, blue or purple papule, 0.1-1 cm in diameter. When thrombosed, they can appear black in colour until evaluated with a dermatoscope when the red or purple colour is more easily seen. Cherry angioma may develop on any part of the body but most often appear on the scalp, face, lips and trunk.  An angioma is due to proliferating endothelial cells; these are the cells that line the inside of a blood vessel.    Angiomas can arise in early life or later in life; the most common type of angioma is a cherry angioma.  Cherry angiomas are very common in males and females of any age or race. They are more noticeable in white skin than in skin of colour. They markedly increase in number from about the age of 40. There may be a family history of similar lesions. Eruptive cherry angiomas have been rarely reported to be associated with internal malignancy. The cause of angiomas is unknown. Genetic analysis of cherry angiomas has shown that they frequently carry specific somatic missense mutations in the GNAQ and GNA11 (Q209H) genes, which are involved in other vascular and " "melanocytic proliferations.      SEBORRHEIC KERATOSIS; NON-INFLAMED    Physical Exam:  Anatomic Location Affected:  trunk  Morphological Description:  Flat and raised, waxy, smooth to warty textured, yellow to brownish-grey to dark brown to blackish, discrete, \"stuck-on\" appearing papules.  Pertinent Positives:  Pertinent Negatives:    Additional History of Present Condition:      Assessment and Plan:  Based on a thorough discussion of this condition and the management approach to it (including a comprehensive discussion of the known risks, side effects and potential benefits of treatment), the patient (family) agrees to implement the following specific plan:  Monitor for changes  Benign, reassured      Seborrheic Keratosis  A seborrheic keratosis is a harmless warty spot that appears during adult life as a common sign of skin aging.  Seborrheic keratoses can arise on any area of skin, covered or uncovered, with the usual exception of the palms and soles. They do not arise from mucous membranes. Seborrheic keratoses can have highly variable appearance.      Seborrheic keratoses are extremely common. It has been estimated that over 90% of adults over the age of 60 years have one or more of them. They occur in males and females of all races, typically beginning to erupt in the 30s or 40s. They are uncommon under the age of 20 years.  The precise cause of seborrhoeic keratoses is not known.  Seborrhoeic keratoses are considered degenerative in nature. As time goes by, seborrheic keratoses tend to become more numerous. Some people inherit a tendency to develop a very large number of them; some people may have hundreds of them.      There is no easy way to remove multiple lesions on a single occasion.  Unless a specific lesion is \"inflamed\" and is causing pain or stinging/burning or is bleeding, most insurance companies do not authorize treatment.      Scribe Attestation      I,:  Bertha Cordova MA am acting " as a scribe while in the presence of the attending physician.:       I,:  Sonam Hernández MD personally performed the services described in this documentation    as scribed in my presence.:

## 2024-10-01 NOTE — PROGRESS NOTES
[facility] Dermatology Clinic Note     Patient Name: Baylee Villafuerte  Encounter Date: 10/1/2024       DO NOT BILL INSURANCE, PATIENT PAID $100 OUT OF POCKET       PROCEDURE:  DESTRUCTION OF ACROCHORDONS    We again discussed methods of removal including that any procedural treatment may not be covered by insurance and would then be the patient's responsibility:       Location: neck, abdomen and back     Description: Flesh colored papules      Number of Lesions Treated: 5     Treatment of lesions chosen: Cryotherapy      After a thorough discussion of treatment options and risk/benefits/alternatives (including but not limited to local pain, scarring, dyspigmentation, blistering, and possible superinfection), verbal and written consent were obtained and the aforementioned lesions were treated on with cryotherapy using liquid nitrogen x 1 cycle for 5-10 seconds.    TOTAL NUMBER of 5 pre-malignant lesions were treated today on the ANATOMIC LOCATION: neck, abdomen, and back.     The patient tolerated the procedure well, and after-care instructions were provided.      Anesthesia: none     Postop care reviewed and discussed: it was recommended to keep area clean with soap and water and keep area clean      Scribe Attestation      I,:  Bertha Cordova MA am acting as a scribe while in the presence of the attending physician.:       I,:  Sonam Hernández MD personally performed the services described in this documentation    as scribed in my presence.:

## 2024-10-04 ENCOUNTER — NURSE TRIAGE (OUTPATIENT)
Age: 59
End: 2024-10-04

## 2024-10-04 DIAGNOSIS — R11.0 NAUSEA: Primary | ICD-10-CM

## 2024-10-04 DIAGNOSIS — K31.84 GASTROPARESIS: ICD-10-CM

## 2024-10-04 RX ORDER — METOCLOPRAMIDE 5 MG/1
5 TABLET ORAL 4 TIMES DAILY
Qty: 60 TABLET | Refills: 3 | Status: SHIPPED | OUTPATIENT
Start: 2024-10-04

## 2024-10-04 NOTE — TELEPHONE ENCOUNTER
"Patient calling in states that she wants to get a message to Alexsandra. States that ever since she ate she's been having severe abd pain and vomiting.  She took the Zofran and diclycomine with no relief. States that she doesn't want to go to the ER but would like her to prescribe the medication that she offered to her for these symptoms.   Reason for Disposition   SEVERE abdominal pain (e.g., excruciating)    Answer Assessment - Initial Assessment Questions  1. LOCATION: \"Where does it hurt?\"      generalized    3. ONSET: \"When did the pain begin?\" (e.g., minutes, hours or days ago)       chronic    6. SEVERITY: \"How bad is the pain?\"  (e.g., Scale 1-10; mild, moderate, or severe)  severe  7. RECURRENT SYMPTOM: \"Have you ever had this type of stomach pain before?\" If Yes, ask: \"When was the last time?\" and \"What happened that time?\"     Ongoing   8. CAUSE: \"What do you think is causing the stomach pain?\"     gastroparesis  9. RELIEVING/AGGRAVATING FACTORS: \"What makes it better or worse nothing.    Protocols used: Abdominal Pain - Female-ADULT-[place]    "

## 2024-10-04 NOTE — TELEPHONE ENCOUNTER
Spoke with patient,reviewed provider recommendations. Told if pain worsens or she cannot stop vomiting she has to go to the emergency room.  Pt voiced understanding.

## 2024-10-09 ENCOUNTER — OFFICE VISIT (OUTPATIENT)
Dept: GASTROENTEROLOGY | Facility: CLINIC | Age: 59
End: 2024-10-09
Payer: MEDICARE

## 2024-10-09 VITALS
WEIGHT: 163.2 LBS | BODY MASS INDEX: 27.86 KG/M2 | SYSTOLIC BLOOD PRESSURE: 130 MMHG | HEIGHT: 64 IN | DIASTOLIC BLOOD PRESSURE: 80 MMHG | TEMPERATURE: 97.5 F | OXYGEN SATURATION: 97 % | HEART RATE: 73 BPM

## 2024-10-09 DIAGNOSIS — K58.1 IRRITABLE BOWEL SYNDROME WITH CONSTIPATION: ICD-10-CM

## 2024-10-09 DIAGNOSIS — K31.84 GASTROPARESIS: Primary | ICD-10-CM

## 2024-10-09 PROCEDURE — 99213 OFFICE O/P EST LOW 20 MIN: CPT | Performed by: PHYSICIAN ASSISTANT

## 2024-10-09 NOTE — PROGRESS NOTES
"Ambulatory Visit  Name: Baylee Villafuerte      : 1965      MRN: [id number]  Encounter Provider: Debbie Dowling PA-C  Encounter Date: 10/9/2024   Encounter department: [facility] [place]    Assessment & Plan  Gastroparesis  GES noted a T 1/2 of 170 minutes  She started low dose Reglan - 5mg QID  She is not sure if it is helping yet    Reviewed a gastroparesis diet in detail - handout provided    Reviewed that we can increase Reglan to 10mg QID       Irritable bowel syndrome with constipation  Worsening as she has been limiting her fiber with gastroparesis  Advised starting Miralax 17g 1-2 times per day         History of Present Illness       Baylee Villafuerte is a 58 y.o. female with a history of rheumatoid arthritis, sicca syndrome, hypertension, hyperlipidemia, GERD, gastroparesis, anxiety who presents for follow-up of irritable bowel syndrome and gastroparesis.  She continues with significant upper GI symptoms of nausea, poor appetite and epigastric and left upper quadrant pain.  She reports that the left upper quadrant pain continues to be consistent but exacerbates at times.  Last week she agreed to start a very low-dose Reglan.  She is taking 5 mg 4 times daily for the past few days.  She does not know if it is helping yet.  She reports worsening constipation.  She believes that this is due to having to limit her fiber intake.  She has no rectal bleeding.  She is not taking anything for this.      Objective     /80 (BP Location: Right arm, Patient Position: Sitting, Cuff Size: Standard)   Pulse 73   Temp 97.5 °F (36.4 °C) (Temporal)   Ht 5' 4\" (1.626 m)   Wt 74 kg (163 lb 3.2 oz)   SpO2 97%   BMI 28.01 kg/m²     Physical Exam  Abdominal:      General: Abdomen is flat. Bowel sounds are normal.      Palpations: Abdomen is soft.      Tenderness: There is abdominal tenderness.         "

## 2024-10-11 ENCOUNTER — TELEPHONE (OUTPATIENT)
Dept: CARDIOLOGY CLINIC | Facility: CLINIC | Age: 59
End: 2024-10-11

## 2024-10-11 NOTE — TELEPHONE ENCOUNTER
Spoke to pt, apologized for the delay. Not sure what happened. Pt was understanding. Advised that I ordered her monitor right away. Should arrive in 2-3 business days. Gave monitor instructions.

## 2024-10-11 NOTE — TELEPHONE ENCOUNTER
Caller: Baylee    Doctor: Dr. Galaviz    Reason for call: pt called to inform us that she has yet to receive her ZIO, as discussed in her last office visit she was supposed to wear this for 2 weeks looks like order was placed on 8/12/24     Call back#: [phone]

## 2024-10-16 ENCOUNTER — TELEPHONE (OUTPATIENT)
Age: 59
End: 2024-10-16

## 2024-10-16 NOTE — TELEPHONE ENCOUNTER
Patient calling because she stated her machine she got from [facility] she doesn't want to use it anymore.  She was told by [facility] to call her office. Please assist.

## 2024-10-20 DIAGNOSIS — K21.9 GASTROESOPHAGEAL REFLUX DISEASE, UNSPECIFIED WHETHER ESOPHAGITIS PRESENT: ICD-10-CM

## 2024-10-21 RX ORDER — RABEPRAZOLE SODIUM 20 MG/1
40 TABLET, DELAYED RELEASE ORAL 2 TIMES DAILY
Qty: 360 TABLET | Refills: 1 | Status: SHIPPED | OUTPATIENT
Start: 2024-10-21

## 2024-10-22 NOTE — TELEPHONE ENCOUNTER
Returned call from patient to discuss.      Patient states she will be returning CPAP.  She's been struggling with it for quite a while now.  It's been difficult due to allergies, migraines and a recent diagnosis of gastroparesis.  Dr. Garcia placed a referral to ENT for her to pursue Inspire.  She also had insurance issues related to the machine and it's been stressful.      Discussed with patient that if she pursues Inspire, she would still need to follow up with sleep to activate and mange the device.  Patient aware.     Scheduled appointment 10/24/24 with [employer] liaison to return machine.  She is aware she will need to sign paperwork that states she is returning the machine against medical advice.

## 2024-10-28 ENCOUNTER — TELEPHONE (OUTPATIENT)
Age: 59
End: 2024-10-28

## 2024-10-28 NOTE — TELEPHONE ENCOUNTER
Patient is wearing a Zio monitor for two weeks. Due to remove in two days. Patient is scheduled for a bone density screening tomorrow and asked if ok to remove monitor one day early.  I advised ok to do if just one day short of 14 days.

## 2024-10-29 ENCOUNTER — HOSPITAL ENCOUNTER (OUTPATIENT)
Dept: RADIOLOGY | Facility: IMAGING CENTER | Age: 59
Discharge: HOME | End: 2024-10-29
Payer: MEDICARE

## 2024-10-29 DIAGNOSIS — M85.89 OSTEOPENIA OF MULTIPLE SITES: ICD-10-CM

## 2024-10-29 PROCEDURE — 77080 DXA BONE DENSITY AXIAL: CPT

## 2024-11-07 ENCOUNTER — CLINICAL SUPPORT (OUTPATIENT)
Dept: CARDIOLOGY CLINIC | Facility: CLINIC | Age: 59
End: 2024-11-07

## 2024-11-07 DIAGNOSIS — R07.89 OTHER CHEST PAIN: ICD-10-CM

## 2024-11-07 DIAGNOSIS — R00.2 PALPITATIONS: ICD-10-CM

## 2024-11-12 ENCOUNTER — OFFICE VISIT (OUTPATIENT)
Dept: GASTROENTEROLOGY | Facility: CLINIC | Age: 59
End: 2024-11-12
Payer: MEDICARE

## 2024-11-12 VITALS
HEART RATE: 78 BPM | WEIGHT: 161 LBS | DIASTOLIC BLOOD PRESSURE: 81 MMHG | HEIGHT: 64 IN | BODY MASS INDEX: 27.49 KG/M2 | TEMPERATURE: 97.2 F | SYSTOLIC BLOOD PRESSURE: 129 MMHG | OXYGEN SATURATION: 98 %

## 2024-11-12 DIAGNOSIS — R10.12 LEFT UPPER QUADRANT ABDOMINAL PAIN: ICD-10-CM

## 2024-11-12 DIAGNOSIS — R11.0 NAUSEA: ICD-10-CM

## 2024-11-12 DIAGNOSIS — K21.9 GASTROESOPHAGEAL REFLUX DISEASE, UNSPECIFIED WHETHER ESOPHAGITIS PRESENT: Primary | ICD-10-CM

## 2024-11-12 DIAGNOSIS — K31.84 GASTROPARESIS: ICD-10-CM

## 2024-11-12 PROCEDURE — 99213 OFFICE O/P EST LOW 20 MIN: CPT | Performed by: PHYSICIAN ASSISTANT

## 2024-11-12 RX ORDER — METOCLOPRAMIDE 5 MG/1
10 TABLET ORAL 4 TIMES DAILY
Qty: 240 TABLET | Refills: 3 | Status: SHIPPED | OUTPATIENT
Start: 2024-11-12

## 2024-11-12 RX ORDER — NORTRIPTYLINE HYDROCHLORIDE 10 MG/1
10 CAPSULE ORAL
Qty: 30 CAPSULE | Refills: 3 | Status: SHIPPED | OUTPATIENT
Start: 2024-11-12

## 2024-11-12 RX ORDER — LEUCOVORIN CALCIUM 10 MG/1
TABLET ORAL
COMMUNITY
Start: 2024-10-24

## 2024-11-12 NOTE — PROGRESS NOTES
Ambulatory Visit  Name: Baylee Villafuerte      : 1965      MRN: [id number]  Encounter Provider: Debbie Dowling PA-C  Encounter Date: 2024   Encounter department: [facility] [place]    Assessment & Plan  Gastroesophageal reflux disease, unspecified whether esophagitis present  Rabeprazole 40mg BID  EGD 2023 with a small hiatal hernia    Gastroparesis  She admits to some improvement in appetite and nausea with Reglan 5mg QID but symptoms are still pretty significant  Increase to 10mg QID  Continue a gastroparesis diet  Left upper quadrant abdominal pain  Still persistent and significant  EGD, Colonoscopy, US, CT all without obvious cause  She is agreeable to trying a TCA again   She previously tried Amitriptyline which caused drowsiness  She is on Mirtazapine qHS - advised to stop this and use only Nortriptyline       History of Present Illness     Baylee Villafuerte is a 59 y.o. female with a long gastrointestinal history who presents for follow-up of gastroparesis and abdominal pain.  She is taking Reglan 5 mg 4 times daily for her gastroparesis.  Although her symptoms are better they are still daily and can be significant.  She reports a very poor appetite.  She is only eating a small meal twice a day.  She has lost about 10 pounds over the past 6 months.  She reports that daily nausea with occasional vomiting.  She continues to report severe left upper quadrant pain.  This has been a chronic complaint of hers.  She has had multiple testing to include EGD, colonoscopy, CT and ultrasound without an obvious cause.  She has been trialed on PPIs, antispasmodics, peppermint oil, amitriptyline and most recently Reglan without improvement.  She does maintain that eating exacerbates his pain.  She continues to report normal bowel movements without severe diarrhea or constipation.  Bowel movements do not seem to affect the pain.  She reports that sometimes  just drinking a glass of water will cause the pain to be severe.  She reports that if she does not eat anything throughout the day she will not have the pain.      History obtained from : patient  Review of Systems  Medical History Reviewed by provider this encounter:  Tobacco  Allergies  Meds  Problems  Med Hx  Surg Hx  Fam Hx       Current Outpatient Medications on File Prior to Visit   Medication Sig Dispense Refill    acyclovir (ZOVIRAX) 5 % ointment APPLY TOPICALLY IF NEEDED (FOR COLD SORES) 30 g 0    atorvastatin (LIPITOR) 10 mg tablet TAKE 1 TABLET BY MOUTH EVERY DAY 90 tablet 1    Auvi-Q 0.3 MG/0.3ML SOAJ as needed Epi pen      carboxymethylcellulose (REFRESH PLUS) 0.5 % SOLN Administer 2 drops to both eyes 2 (two) times a day as needed for dry eyes        DULoxetine (CYMBALTA) 30 mg delayed release capsule Take 30 mg by mouth daily Taking 60 daily      ergocalciferol (VITAMIN D2) 50,000 units TAKE 1 CAPSULE BY MOUTH ONE TIME PER WEEK 12 capsule 0    folic acid (FOLVITE) 1 mg tablet Take 2,000 mcg by mouth 2 (two) times a day      Galcanezumab-gnlm 120 MG/ML SOAJ Inject 120 mg under the skin every 28 days (3 month supply so she doesn't have to wait for the prescription delayed every month) 3 mL 4    hydroxychloroquine (PLAQUENIL) 200 mg tablet Take 200 mg by mouth 2 (two) times a day with meals      ketoconazole (NIZORAL) 2 % cream Apply topically daily For 2 weeks when red scaly spot appears on forehead 30 g 2    methocarbamol (Robaxin-750) 750 mg tablet Take 1 tablet (750 mg total) by mouth 3 (three) times a day as needed for muscle spasms 30 tablet 3    methotrexate 2.5 MG tablet Take 2.5 mg by mouth once a week 4 tablets on Monday and 2 tablets on Tuesday      mirtazapine (REMERON) 45 MG tablet TAKE 1 TABLET (45 MG TOTAL) BY MOUTH AS NEEDED AT BEDTIME 100 tablet 1    ondansetron (ZOFRAN-ODT) 4 mg disintegrating tablet Take 1 tablet (4 mg total) by mouth every 8 (eight) hours as needed for nausea  "or vomiting 60 tablet 6    RABEprazole (ACIPHEX) 20 MG tablet TAKE 2 TABLETS BY MOUTH TWICE A  tablet 1    rimegepant sulfate (Nurtec) 75 mg TBDP Take one NURTEC 75 mg at onset on (or under) tongue daily as needed for migraine 16 tablet 11    rizatriptan (MAXALT) 10 mg tablet TAKE 1 TABLET (10 MG TOTAL) BY MOUTH ONCE AS NEEDED FOR MIGRAINE MAY REPEAT IN 2 HOURS IF NEEDED. 9 tablet 5    Xiidra 5 % op solution Administer to both eyes 2 (two) times a day      [DISCONTINUED] metoclopramide (REGLAN) 5 mg tablet Take 1 tablet (5 mg total) by mouth 4 (four) times a day 60 tablet 3    estradiol (ESTRACE) 0.1 mg/g vaginal cream Insert 2 g into the vagina daily Use daily for 2 weeks, then decrease to 2-3 times per week until no longer necessary (Patient not taking: Reported on 9/12/2024) 42 g 3    leucovorin (WELLCOVORIN) 10 MG tablet TAKE 2 TABLETS BY MOUTH 24 HOURS AFTER TAKING METHOTREXATE (WED) (Patient not taking: Reported on 11/12/2024)      VIT B12-METHIONINE-INOS-CHOL IM  (Patient not taking: Reported on 9/12/2024)       No current facility-administered medications on file prior to visit.          Objective     /81 (BP Location: Left arm, Patient Position: Sitting, Cuff Size: Standard)   Pulse 78   Temp (!) 97.2 °F (36.2 °C) (Temporal)   Ht 5' 4\" (1.626 m)   Wt 73 kg (161 lb)   SpO2 98%   BMI 27.64 kg/m²     Physical Exam    "

## 2024-11-15 ENCOUNTER — RA CDI HCC (OUTPATIENT)
Dept: OTHER | Facility: HOSPITAL | Age: 59
End: 2024-11-15

## 2024-11-26 ENCOUNTER — OFFICE VISIT (OUTPATIENT)
Dept: INTERNAL MEDICINE CLINIC | Age: 59
End: 2024-11-26
Payer: MEDICARE

## 2024-11-26 VITALS
HEIGHT: 64 IN | SYSTOLIC BLOOD PRESSURE: 116 MMHG | TEMPERATURE: 97.5 F | WEIGHT: 159 LBS | BODY MASS INDEX: 27.14 KG/M2 | OXYGEN SATURATION: 97 % | DIASTOLIC BLOOD PRESSURE: 68 MMHG | HEART RATE: 79 BPM

## 2024-11-26 DIAGNOSIS — E61.1 IRON DEFICIENCY: ICD-10-CM

## 2024-11-26 DIAGNOSIS — M06.09 RHEUMATOID ARTHRITIS OF MULTIPLE SITES WITH NEGATIVE RHEUMATOID FACTOR (HCC): ICD-10-CM

## 2024-11-26 DIAGNOSIS — M35.05 SJOGREN SYNDROME WITH INFLAMMATORY ARTHRITIS (HCC): ICD-10-CM

## 2024-11-26 DIAGNOSIS — E78.00 HYPERCHOLESTEROLEMIA: Primary | ICD-10-CM

## 2024-11-26 DIAGNOSIS — Z23 NEED FOR INFLUENZA VACCINATION: ICD-10-CM

## 2024-11-26 DIAGNOSIS — Z00.00 WELCOME TO MEDICARE PREVENTIVE VISIT: ICD-10-CM

## 2024-11-26 DIAGNOSIS — M85.89 OSTEOPENIA OF MULTIPLE SITES: ICD-10-CM

## 2024-11-26 DIAGNOSIS — G43.019 INTRACTABLE MIGRAINE WITHOUT AURA AND WITHOUT STATUS MIGRAINOSUS: ICD-10-CM

## 2024-11-26 DIAGNOSIS — G47.33 OSA (OBSTRUCTIVE SLEEP APNEA): ICD-10-CM

## 2024-11-26 PROBLEM — R07.89 OTHER CHEST PAIN: Status: RESOLVED | Noted: 2019-05-01 | Resolved: 2024-11-26

## 2024-11-26 PROCEDURE — G0403 EKG FOR INITIAL PREVENT EXAM: HCPCS | Performed by: FAMILY MEDICINE

## 2024-11-26 PROCEDURE — G0402 INITIAL PREVENTIVE EXAM: HCPCS | Performed by: FAMILY MEDICINE

## 2024-11-26 PROCEDURE — 90673 RIV3 VACCINE NO PRESERV IM: CPT

## 2024-11-26 PROCEDURE — G0008 ADMIN INFLUENZA VIRUS VAC: HCPCS

## 2024-11-26 PROCEDURE — 99214 OFFICE O/P EST MOD 30 MIN: CPT | Performed by: FAMILY MEDICINE

## 2024-11-26 NOTE — PATIENT INSTRUCTIONS
Medicare Preventive Visit Patient Instructions  Thank you for completing your Welcome to Medicare Visit or Medicare Annual Wellness Visit today. Your next wellness visit will be due in one year (11/27/2025).  The screening/preventive services that you may require over the next 5-10 years are detailed below. Some tests may not apply to you based off risk factors and/or age. Screening tests ordered at today's visit but not completed yet may show as past due. Also, please note that scanned in results may not display below.  Preventive Screenings:  Service Recommendations Previous Testing/Comments   Colorectal Cancer Screening  * Colonoscopy    * Fecal Occult Blood Test (FOBT)/Fecal Immunochemical Test (FIT)  * Fecal DNA/Cologuard Test  * Flexible Sigmoidoscopy Age: 45-75 years old   Colonoscopy: every 10 years (may be performed more frequently if at higher risk)  OR  FOBT/FIT: every 1 year  OR  Cologuard: every 3 years  OR  Sigmoidoscopy: every 5 years  Screening may be recommended earlier than age 45 if at higher risk for colorectal cancer. Also, an individualized decision between you and your healthcare provider will decide whether screening between the ages of 76-85 would be appropriate. Colonoscopy: 01/31/2023  FOBT/FIT: Not on file  Cologuard: Not on file  Sigmoidoscopy: Not on file    Screening Current     Breast Cancer Screening Age: 40+ years old  Frequency: every 1-2 years  Not required if history of left and right mastectomy Mammogram: 04/24/2024    Screening Current   Cervical Cancer Screening Between the ages of 21-29, pap smear recommended once every 3 years.   Between the ages of 30-65, can perform pap smear with HPV co-testing every 5 years.   Recommendations may differ for women with a history of total hysterectomy, cervical cancer, or abnormal pap smears in past. Pap Smear: 08/06/2024    Screening Current   Hepatitis C Screening Once for adults born between 1945 and 1965  More frequently in patients at  high risk for Hepatitis C Hep C Antibody: 06/05/2023    Screening Current   Diabetes Screening 1-2 times per year if you're at risk for diabetes or have pre-diabetes Fasting glucose: 93 mg/dL (9/3/2024)  A1C: No results in last 5 years (No results in last 5 years)  Screening Current   Cholesterol Screening Once every 5 years if you don't have a lipid disorder. May order more often based on risk factors. Lipid panel: 09/03/2024    Screening Not Indicated  History Lipid Disorder     Other Preventive Screenings Covered by Medicare:  Abdominal Aortic Aneurysm (AAA) Screening: covered once if your at risk. You're considered to be at risk if you have a family history of AAA.  Lung Cancer Screening: covers low dose CT scan once per year if you meet all of the following conditions: (1) Age 55-77; (2) No signs or symptoms of lung cancer; (3) Current smoker or have quit smoking within the last 15 years; (4) You have a tobacco smoking history of at least 20 pack years (packs per day multiplied by number of years you smoked); (5) You get a written order from a healthcare provider.  Glaucoma Screening: covered annually if you're considered high risk: (1) You have diabetes OR (2) Family history of glaucoma OR (3)  aged 50 and older OR (4)  American aged 65 and older  Osteoporosis Screening: covered every 2 years if you meet one of the following conditions: (1) You're estrogen deficient and at risk for osteoporosis based off medical history and other findings; (2) Have a vertebral abnormality; (3) On glucocorticoid therapy for more than 3 months; (4) Have primary hyperparathyroidism; (5) On osteoporosis medications and need to assess response to drug therapy.   Last bone density test (DXA Scan): 10/29/2024.  HIV Screening: covered annually if you're between the age of 15-65. Also covered annually if you are younger than 15 and older than 65 with risk factors for HIV infection. For pregnant patients, it is  covered up to 3 times per pregnancy.    Immunizations:  Immunization Recommendations   Influenza Vaccine Annual influenza vaccination during flu season is recommended for all persons aged >= 6 months who do not have contraindications   Pneumococcal Vaccine   * Pneumococcal conjugate vaccine = PCV13 (Prevnar 13), PCV15 (Vaxneuvance), PCV20 (Prevnar 20)  * Pneumococcal polysaccharide vaccine = PPSV23 (Pneumovax) Adults 19-65 yo with certain risk factors or if 65+ yo  If never received any pneumonia vaccine: recommend Prevnar 20 (PCV20)  Give PCV20 if previously received 1 dose of PCV13 or PPSV23   Hepatitis B Vaccine 3 dose series if at intermediate or high risk (ex: diabetes, end stage renal disease, liver disease)   Respiratory syncytial virus (RSV) Vaccine - COVERED BY MEDICARE PART D  * RSVPreF3 (Arexvy) CDC recommends that adults 60 years of age and older may receive a single dose of RSV vaccine using shared clinical decision-making (SCDM)   Tetanus (Td) Vaccine - COST NOT COVERED BY MEDICARE PART B Following completion of primary series, a booster dose should be given every 10 years to maintain immunity against tetanus. Td may also be given as tetanus wound prophylaxis.   Tdap Vaccine - COST NOT COVERED BY MEDICARE PART B Recommended at least once for all adults. For pregnant patients, recommended with each pregnancy.   Shingles Vaccine (Shingrix) - COST NOT COVERED BY MEDICARE PART B  2 shot series recommended in those 19 years and older who have or will have weakened immune systems or those 50 years and older     Health Maintenance Due:      Topic Date Due   • HIV Screening  Never done   • Cervical Cancer Screening  06/19/2024   • Breast Cancer Screening: Mammogram  04/24/2025   • Colorectal Cancer Screening  01/28/2033   • Hepatitis C Screening  Completed     Immunizations Due:      Topic Date Due   • Pneumococcal Vaccine: Pediatrics (0 to 5 Years) and At-Risk Patients (6 to 64 Years) (1 of 2 - PCV) Never  done   • Influenza Vaccine (1) 09/01/2024   • COVID-19 Vaccine (7 - 2024-25 season) 09/01/2024     Advance Directives   What are advance directives?  Advance directives are legal documents that state your wishes and plans for medical care. These plans are made ahead of time in case you lose your ability to make decisions for yourself. Advance directives can apply to any medical decision, such as the treatments you want, and if you want to donate organs.   What are the types of advance directives?  There are many types of advance directives, and each state has rules about how to use them. You may choose a combination of any of the following:  Living will:  This is a written record of the treatment you want. You can also choose which treatments you do not want, which to limit, and which to stop at a certain time. This includes surgery, medicine, IV fluid, and tube feedings.   Durable power of  for healthcare (DPAHC):  This is a written record that states who you want to make healthcare choices for you when you are unable to make them for yourself. This person, called a proxy, is usually a family member or a friend. You may choose more than 1 proxy.  Do not resuscitate (DNR) order:  A DNR order is used in case your heart stops beating or you stop breathing. It is a request not to have certain forms of treatment, such as CPR. A DNR order may be included in other types of advance directives.  Medical directive:  This covers the care that you want if you are in a coma, near death, or unable to make decisions for yourself. You can list the treatments you want for each condition. Treatment may include pain medicine, surgery, blood transfusions, dialysis, IV or tube feedings, and a ventilator (breathing machine).  Values history:  This document has questions about your views, beliefs, and how you feel and think about life. This information can help others choose the care that you would choose.  Why are advance  directives important?  An advance directive helps you control your care. Although spoken wishes may be used, it is better to have your wishes written down. Spoken wishes can be misunderstood, or not followed. Treatments may be given even if you do not want them. An advance directive may make it easier for your family to make difficult choices about your care.   Weight Management   Why it is important to manage your weight:  Being overweight increases your risk of health conditions such as heart disease, high blood pressure, type 2 diabetes, and certain types of cancer. It can also increase your risk for osteoarthritis, sleep apnea, and other respiratory problems. Aim for a slow, steady weight loss. Even a small amount of weight loss can lower your risk of health problems.  How to lose weight safely:  A safe and healthy way to lose weight is to eat fewer calories and get regular exercise. You can lose up about 1 pound a week by decreasing the number of calories you eat by 500 calories each day.   Healthy meal plan for weight management:  A healthy meal plan includes a variety of foods, contains fewer calories, and helps you stay healthy. A healthy meal plan includes the following:  Eat whole-grain foods more often.  A healthy meal plan should contain fiber. Fiber is the part of grains, fruits, and vegetables that is not broken down by your body. Whole-grain foods are healthy and provide extra fiber in your diet. Some examples of whole-grain foods are whole-wheat breads and pastas, oatmeal, brown rice, and bulgur.  Eat a variety of vegetables every day.  Include dark, leafy greens such as spinach, kale, denny greens, and mustard greens. Eat yellow and orange vegetables such as carrots, sweet potatoes, and winter squash.   Eat a variety of fruits every day.  Choose fresh or canned fruit (canned in its own juice or light syrup) instead of juice. Fruit juice has very little or no fiber.  Eat low-fat dairy foods.  Drink  fat-free (skim) milk or 1% milk. Eat fat-free yogurt and low-fat cottage cheese. Try low-fat cheeses such as mozzarella and other reduced-fat cheeses.  Choose meat and other protein foods that are low in fat.  Choose beans or other legumes such as split peas or lentils. Choose fish, skinless poultry (chicken or turkey), or lean cuts of red meat (beef or pork). Before you cook meat or poultry, cut off any visible fat.   Use less fat and oil.  Try baking foods instead of frying them. Add less fat, such as margarine, sour cream, regular salad dressing and mayonnaise to foods. Eat fewer high-fat foods. Some examples of high-fat foods include french fries, doughnuts, ice cream, and cakes.  Eat fewer sweets.  Limit foods and drinks that are high in sugar. This includes candy, cookies, regular soda, and sweetened drinks.  Exercise:  Exercise at least 30 minutes per day on most days of the week. Some examples of exercise include walking, biking, dancing, and swimming. You can also fit in more physical activity by taking the stairs instead of the elevator or parking farther away from stores. Ask your healthcare provider about the best exercise plan for you.      © Copyright [employer] 2018 Information is for End User's use only and may not be sold, redistributed or otherwise used for commercial purposes. All illustrations and images included in CareNotes® are the copyrighted property of [employer] or [employer]

## 2024-11-26 NOTE — PROGRESS NOTES
Name: Baylee Villafuerte      : 1965      MRN: [id number]  Encounter Provider: Jadiel Young DO  Encounter Date: 2024   Encounter department: [facility]    Assessment & Plan  Welcome to Medicare preventive visit    Orders:    POCT ECG    Iron deficiency         Hypercholesterolemia    Orders:    Lipid Panel with Direct LDL reflex; Future    Sjogren syndrome with inflammatory arthritis (HCC)         Rheumatoid arthritis of multiple sites with negative rheumatoid factor (HCC)         Osteopenia of multiple sites         Intractable migraine without aura and without status migrainosus         DIPTI (obstructive sleep apnea)         Need for influenza vaccination    Orders:    influenza vaccine, recombinant, PF, 0.5 mL IM (Flublok)       Preventive health issues were discussed with patient, and age appropriate screening tests were ordered as noted in patient's After Visit Summary. Personalized health advice and appropriate referrals for health education or preventive services given if needed, as noted in patient's After Visit Summary.    History of Present Illness     HPI     Rheumatoid arthritis with out positive rheumatoid factor.  Seeing Dr. Watkins rheumatologist and started on hydroxychloroquine.  Patient is concerned that that may be making her retinal issues worse however patient has had retinal issues for quite some time and is following very closely with Ophthalmology.  She is not having any other side effects from the medication.  2023, recently off rheumatoid medications due to urinary tract infection, will start next week, feels that she has a flare when she is not on them.  Garcia are hard on her joints  2024, patient seeing rheumatology.  Rheumatology placed her back on prednisone and she continues with methotrexate but cannot be weaned off the methotrexate.  They feel that her musculoskeletal symptoms are largely related to Sjogren's      GERD, taking PPI and falls with specialist.  Well controlled.     Diastolic hypertension.  Continue to monitor.  Tried to you will limit her salt intake.  December 2022, compliant with chlorothiazide own.  Has hypokalemia on blood test results.  December 2023, well-controlled, no issues   June 2024, controlled no issues  November 2024, well-controlled     Migraines.  Relatively well controlled with medication.  Slightly exacerbated with COVID-19 infection. Nov 2021 started on emgality by neuro and is working, does not take imitrex now  May 2022: stable  December 2022, stable  December 2023, on Maxide by neurology which has been working well however her eye doctor who treats her Sjogren's syndrome does not want her on this medication.  Patient is tolerating well except it makes her urinate frequently  June 2024, migraines have been largely stable  November 2024, stable no breakthrough issues     Osteopenia of multiple sites.  Due for vitamin-D level.may 2022, did not get labs done, due for DEXA July 2022 December 2023, taking calcium and vitamin D, due for repeat bone density study summer 2024  June 2024, stable T-score     Pancreatitis resolved but has a history of frequency.  December 2022, her rheumatologist thinks that this was all related to Sjogren syndrome as well as rheumatoid arthritis.     Hyperlipidemia, tolerating atorvastatin.  Due for labs.nov 2021 stopped Lipitor but not sure why   May 2022: taking meds. Due for labs   December 2022, last lipid levels noted.  Has script for new test  June 2024 due for lipid level  November 2024, due for lab work     Insomnia primary, stable with Ambien which is not new for her., May 2022:  Stable  June 2024, not sleeping well at all which is her usual.  Stopped Ambien due to habit-forming tendency  November 2024, fluctuates     Arthralgias: wasnt able to see rheumatology due to COVID restrictions, she would like another referral    December 2022, ongoing treatment  with Rheumatology.  Diagnosed with rheumatoid arthritis and placed on hydroxychloroquine.  Has flare-ups from time to time and is trying to change her life to avoid this.  December 2023, recently off rheumatoid medications due to urinary tract infection, will start next week, feels that she has a flare when she is not on them.  Gracia are hard on her joints  June 2024, see under rheumatology, symptoms largely related to Sjogren's, recently started on Cymbalta 2 days ago  November 2024, relatively stable, always has pain on a daily basis following with rheumatology         Patient Care Team:  Jadiel Young DO as PCP - General (Family Medicine)  MD Rafy Gonzalez DO as Endoscopist  Debbie Dowling PA-C as Physician Assistant (Physician Assistant)  Prabha Weber PA-C as Physician Assistant (Hematology and Oncology)  Debbie Dowling PA-C as Physician Assistant (Gastroenterology)            Review of Systems    Constitutional:  Denies fever or chills   Eyes:  Denies double , blurry vision or eye pain  HENT:  Denies nasal congestion, sore throat or new hearing issues  Respiratory:  Denies cough or shortness of breath or wheezing  Cardiovascular:  Denies palpitations or chest pain  GI:  Denies abdominal pain, nausea, or vomiting, no loose stools, no reflux  Integument:  Denies rash , no open areas  Neurologic:  Denies headache or focal weakness, no dizziness  : no dysuria, or hematuria    Medical History Reviewed by provider this encounter:  Tobacco  Allergies  Meds  Problems  Med Hx  Surg Hx  Fam Hx       Annual Wellness Visit Questionnaire   Baylee is here for her Subsequent Wellness visit.     Health Risk Assessment:   Patient rates overall health as poor. Patient feels that their physical health rating is much worse. Patient is dissatisfied with their life. Eyesight was rated as slightly worse. Hearing was rated as same. Patient feels that their emotional and mental  health rating is same. Patients states they are never, rarely angry. Patient states they are often unusually tired/fatigued. Pain experienced in the last 7 days has been a lot. Patient's pain rating has been 7/10. Patient states that she has experienced weight loss or gain in last 6 months.     Depression Screening:   PHQ-2 Score: 2      Fall Risk Screening:   In the past year, patient has experienced: no history of falling in past year      Urinary Incontinence Screening:   Patient has not leaked urine accidently in the last six months.     Home Safety:  Patient does not have trouble with stairs inside or outside of their home. Patient has working smoke alarms and has working carbon monoxide detector. Home safety hazards include: none.     Nutrition:   Current diet is Low Saturated Fat and Other (please comment).     Medications:   Patient is not currently taking any over-the-counter supplements. Patient is able to manage medications.     Activities of Daily Living (ADLs)/Instrumental Activities of Daily Living (IADLs):   Walk and transfer into and out of bed and chair?: Yes  Dress and groom yourself?: Yes    Bathe or shower yourself?: Yes    Feed yourself? Yes  Do your laundry/housekeeping?: Yes  Manage your money, pay your bills and track your expenses?: Yes  Make your own meals?: Yes    Do your own shopping?: Yes    Previous Hospitalizations:   Any hospitalizations or ED visits within the last 12 months?: Yes    How many hospitalizations have you had in the last year?: 1-2    Advance Care Planning:   Living will: No    Durable POA for healthcare: No    Advanced directive: No      Comments: Her      Cognitive Screening:   Provider or family/friend/caregiver concerned regarding cognition?: No    PREVENTIVE SCREENINGS      Cardiovascular Screening:    General: Screening Not Indicated and History Lipid Disorder      Diabetes Screening:     General: Screening Current      Colorectal Cancer Screening:      General: Screening Current      Breast Cancer Screening:     General: Screening Current      Cervical Cancer Screening:    General: Screening Current      Lung Cancer Screening:     General: Screening Not Indicated      Hepatitis C Screening:    General: Screening Current    Screening, Brief Intervention, and Referral to Treatment (SBIRT)    Screening  Typical number of drinks in a day: 0  Typical number of drinks in a week: 0  Interpretation: Low risk drinking behavior.    AUDIT-C Screenin) How often did you have a drink containing alcohol in the past year? never  2) How many drinks did you have on a typical day when you were drinking in the past year? 0  3) How often did you have 6 or more drinks on one occasion in the past year? never    AUDIT-C Score: 0  Interpretation: Score 0-2 (female): Negative screen for alcohol misuse    Single Item Drug Screening:  How often have you used an illegal drug (including marijuana) or a prescription medication for non-medical reasons in the past year? never    Single Item Drug Screen Score: 0  Interpretation: Negative screen for possible drug use disorder    Brief Intervention  Alcohol & drug use screenings were reviewed. No concerns regarding substance use disorder identified.     Other Counseling Topics:   Car/seat belt/driving safety, skin self-exam, sunscreen and regular weightbearing exercise and calcium and vitamin D intake.     Social Drivers of Health     Food Insecurity: Food Insecurity Present (2024)    Hunger Vital Sign     Worried About Running Out of Food in the Last Year: Sometimes true     Ran Out of Food in the Last Year: Patient declined   Transportation Needs: No Transportation Needs (2024)    PRAPARE - Transportation     Lack of Transportation (Medical): No     Lack of Transportation (Non-Medical): No   Housing Stability: Unknown (2024)    Housing Stability Vital Sign     Unable to Pay for Housing in the Last Year: No     Homeless in  "the Last Year: No   Utilities: Not At Risk (11/22/2024)    [facility] Utilities     Threatened with loss of utilities: No     Vision Screening    Right eye Left eye Both eyes   Without correction 20/30 20/25 20/20   With correction          Objective   /68 (BP Location: Left arm, Patient Position: Sitting, Cuff Size: Standard)   Pulse 79   Temp 97.5 °F (36.4 °C) (Temporal)   Ht 5' 4\" (1.626 m)   Wt 72.1 kg (159 lb)   SpO2 97%   BMI 27.29 kg/m²     Physical Exam    Constitutional:  Well developed, well nourished, no acute distress, non-toxic appearance   Eyes:  PERRL, conjunctiva normal , non icteric sclera  HENT:  Atraumatic, oropharynx moist. Neck-  supple   Respiratory:  CTA b/l, normal breath sounds, no rales, no wheezing   Cardiovascular:  RRR, no murmurs, no LE edema b/l  GI:  Soft, nondistended, normal bowel sounds x 4, nontender, no organomegaly, no mass, no rebound, no guarding   Neurologic:  no focal deficits noted   Psychiatric:  Speech and behavior appropriate , AAO x 3      "

## 2024-12-04 DIAGNOSIS — R11.0 NAUSEA: ICD-10-CM

## 2024-12-04 DIAGNOSIS — K21.9 GASTROESOPHAGEAL REFLUX DISEASE, UNSPECIFIED WHETHER ESOPHAGITIS PRESENT: ICD-10-CM

## 2024-12-04 DIAGNOSIS — R10.12 LEFT UPPER QUADRANT ABDOMINAL PAIN: ICD-10-CM

## 2024-12-05 RX ORDER — NORTRIPTYLINE HYDROCHLORIDE 10 MG/1
10 CAPSULE ORAL
Qty: 90 CAPSULE | Refills: 1 | Status: SHIPPED | OUTPATIENT
Start: 2024-12-05

## 2024-12-18 DIAGNOSIS — N95.2 VAGINAL ATROPHY: ICD-10-CM

## 2024-12-18 RX ORDER — ESTRADIOL 0.1 MG/G
2 CREAM VAGINAL DAILY
Qty: 126 G | Refills: 1 | Status: SHIPPED | OUTPATIENT
Start: 2024-12-18

## 2025-01-10 DIAGNOSIS — N95.2 VAGINAL ATROPHY: ICD-10-CM

## 2025-01-10 RX ORDER — ESTRADIOL 0.1 MG/G
2 CREAM VAGINAL DAILY
Qty: 381 G | Refills: 0 | Status: SHIPPED | OUTPATIENT
Start: 2025-01-10

## 2025-01-13 ENCOUNTER — HOSPITAL ENCOUNTER (EMERGENCY)
Facility: HOSPITAL | Age: 60
Discharge: HOME | End: 2025-01-13
Attending: EMERGENCY MEDICINE
Payer: MEDICARE

## 2025-01-13 ENCOUNTER — APPOINTMENT (OUTPATIENT)
Dept: CT IMAGING | Facility: HOSPITAL | Age: 60
End: 2025-01-13
Payer: MEDICARE

## 2025-01-13 VITALS
TEMPERATURE: 97.9 F | HEART RATE: 113 BPM | SYSTOLIC BLOOD PRESSURE: 141 MMHG | DIASTOLIC BLOOD PRESSURE: 88 MMHG | OXYGEN SATURATION: 98 % | RESPIRATION RATE: 18 BRPM

## 2025-01-13 DIAGNOSIS — G51.0 BELL'S PALSY: Primary | ICD-10-CM

## 2025-01-13 PROCEDURE — 70450 CT HEAD/BRAIN W/O DYE: CPT

## 2025-01-13 PROCEDURE — 99284 EMERGENCY DEPT VISIT MOD MDM: CPT

## 2025-01-13 RX ORDER — PREDNISONE 20 MG/1
40 TABLET ORAL DAILY
Qty: 10 TABLET | Refills: 0 | Status: SHIPPED | OUTPATIENT
Start: 2025-01-14 | End: 2025-01-22 | Stop reason: ALTCHOICE

## 2025-01-13 RX ORDER — VALACYCLOVIR HYDROCHLORIDE 1 G/1
1000 TABLET, FILM COATED ORAL 3 TIMES DAILY
Qty: 21 TABLET | Refills: 0 | Status: SHIPPED | OUTPATIENT
Start: 2025-01-13 | End: 2025-01-22 | Stop reason: ALTCHOICE

## 2025-01-13 NOTE — ED NOTES
Provider Janice called in to evaluate PT. Instructed this writer not to call a stroke alert.      Solange Driver RN  01/13/25 [id number]

## 2025-01-15 ENCOUNTER — OFFICE VISIT (OUTPATIENT)
Dept: INTERNAL MEDICINE CLINIC | Age: 60
End: 2025-01-15
Payer: MEDICARE

## 2025-01-15 VITALS
DIASTOLIC BLOOD PRESSURE: 80 MMHG | TEMPERATURE: 98.3 F | HEART RATE: 85 BPM | OXYGEN SATURATION: 96 % | WEIGHT: 160 LBS | BODY MASS INDEX: 27.31 KG/M2 | SYSTOLIC BLOOD PRESSURE: 122 MMHG | HEIGHT: 64 IN

## 2025-01-15 DIAGNOSIS — D84.9 IMMUNOCOMPROMISED STATE (HCC): ICD-10-CM

## 2025-01-15 DIAGNOSIS — M06.09 RHEUMATOID ARTHRITIS OF MULTIPLE SITES WITH NEGATIVE RHEUMATOID FACTOR (HCC): ICD-10-CM

## 2025-01-15 DIAGNOSIS — G51.0 BELL'S PALSY: Primary | ICD-10-CM

## 2025-01-15 DIAGNOSIS — G43.019 INTRACTABLE MIGRAINE WITHOUT AURA AND WITHOUT STATUS MIGRAINOSUS: ICD-10-CM

## 2025-01-15 DIAGNOSIS — K21.9 GASTROESOPHAGEAL REFLUX DISEASE WITHOUT ESOPHAGITIS: ICD-10-CM

## 2025-01-15 DIAGNOSIS — Z09 HOSPITAL DISCHARGE FOLLOW-UP: ICD-10-CM

## 2025-01-15 PROCEDURE — 99214 OFFICE O/P EST MOD 30 MIN: CPT | Performed by: INTERNAL MEDICINE

## 2025-01-15 RX ORDER — FAMOTIDINE 20 MG/1
20 TABLET, FILM COATED ORAL 2 TIMES DAILY
Qty: 18 TABLET | Refills: 0 | Status: SHIPPED | OUTPATIENT
Start: 2025-01-15 | End: 2025-01-22 | Stop reason: ALTCHOICE

## 2025-01-15 RX ORDER — PREDNISONE 20 MG/1
60 TABLET ORAL DAILY
Qty: 12 TABLET | Refills: 0 | Status: SHIPPED | OUTPATIENT
Start: 2025-01-17 | End: 2025-01-22 | Stop reason: ALTCHOICE

## 2025-01-15 NOTE — ASSESSMENT & PLAN NOTE
-Likely being triggered by facial pain  -Continue with 6 hourly acetaminophen, Cymbalta, and prednisone  -Keep well-hydrated as much as possible  -Will send a message to her neurologist to try her ASAP

## 2025-01-15 NOTE — ASSESSMENT & PLAN NOTE
-Not well-controlled likely secondary to steroid use  -Continue with rabeprazole and will start patient on famotidine  -Continue to avoid diets and behaviors that trigger symptoms    Orders:  •  famotidine (PEPCID) 20 mg tablet; Take 1 tablet (20 mg total) by mouth 2 (two) times a day

## 2025-01-15 NOTE — PROGRESS NOTES
Name: Baylee Villafuerte      : 1965      MRN: [id number]  Encounter Provider: Lillian Lai DO  Encounter Date: 1/15/2025   Encounter department: [facility]  :  Assessment & Plan  Hospital discharge follow-up         Bell's palsy  -With persistent symptoms of left facial droop  -CT head done in the emergency department was negative for acute intracranial abnormality  -Continue with valacyclovir and will extend her prednisone for total of 7 days  .-She was counseled to go to the emergency department symptoms worsen  -Follow-up with neurology  Orders:  •  predniSONE 20 mg tablet; Take 3 tablets (60 mg total) by mouth daily Do not start before 2025.    Intractable migraine without aura and without status migrainosus  -Likely being triggered by facial pain  -Continue with 6 hourly acetaminophen, Cymbalta, and prednisone  -Keep well-hydrated as much as possible  -Will send a message to her neurologist to try her ASAP       Gastroesophageal reflux disease without esophagitis  -Not well-controlled likely secondary to steroid use  -Continue with rabeprazole and will start patient on famotidine  -Continue to avoid diets and behaviors that trigger symptoms    Orders:  •  famotidine (PEPCID) 20 mg tablet; Take 1 tablet (20 mg total) by mouth 2 (two) times a day    Immunocompromised state (HCC)  -This likely put patient at risk for Bell's palsy  -Will continue to monitor clinically  -Continue with multivitamins       Rheumatoid arthritis of multiple sites with negative rheumatoid factor (HCC)  -Managed by rheumatology  -Continue with current medications              History of Present Illness     HPI  Patient presents for a follow-up visit regarding her recent diagnosis for Bell's palsy.  She states She woke up 2 days ago and felt sick but had no facial change that morning.  As the day went on she noticed dripping of fluid from her mouth and then tingling of her  left hand and then a headache and when she went to look at the mirror, she said that she had a facial droop and went to the ED.  Did a head ct scan that was normal   Had a hx of migraines and states that she has had persistent headache   Taking maxalt but it is not helping.  She is also taking acetaminophen every 6 hours as well as Cymbalta.  Unfortunately, she is not drinking much water because of her gastroparesis.  Has a hx of migraine headaches and maida   Sees neurology for her migraine headaches.  She does have an appointment in about 2 weeks.  She is on methotrexate and had a canker sore 2 days pror to onset of symptoms   Has been having lightheadness and feels off balance.   She denies any fever, chills, night sweats,  nasal congestion, runny nose, pnd, sore throat, ear ache, sinus pain or pressure, wheezing, cough, chest pain, sob, palpitations, nausea, vomiting, diarrhea, constipation, hematochezia, hematuria, melena stools, arthralgias, myalgias, feelings of anxiety, depression or insomnia.    Review of Systems   Constitutional:  Negative for activity change, chills, fatigue, fever and unexpected weight change.   HENT:  Negative for ear pain, postnasal drip, rhinorrhea, sinus pressure and sore throat.    Eyes:  Negative for pain.   Respiratory:  Negative for cough, choking, chest tightness, shortness of breath and wheezing.    Cardiovascular:  Negative for chest pain, palpitations and leg swelling.   Gastrointestinal:  Negative for abdominal pain, constipation, diarrhea, nausea and vomiting.   Genitourinary:  Negative for dysuria and hematuria.   Musculoskeletal:  Negative for arthralgias, back pain, gait problem, joint swelling, myalgias and neck stiffness.   Skin:  Negative for pallor and rash.   Neurological:  Positive for dizziness (impaired balance), weakness (left facial droop) and headaches. Negative for tremors, seizures, syncope and light-headedness.   Hematological:  Negative for adenopathy.  "  Psychiatric/Behavioral:  Negative for behavioral problems.        Objective   /80 (BP Location: Left arm, Patient Position: Sitting, Cuff Size: Standard)   Pulse 85   Temp 98.3 °F (36.8 °C) (Temporal)   Ht 5' 4\" (1.626 m)   Wt 72.6 kg (160 lb)   SpO2 96%   BMI 27.46 kg/m²      Physical Exam  Constitutional:       General: She is not in acute distress.     Appearance: She is well-developed. She is ill-appearing. She is not diaphoretic.   HENT:      Head: Normocephalic and atraumatic.      Right Ear: External ear normal.      Left Ear: External ear normal.      Nose: Nose normal.      Mouth/Throat:      Pharynx: No oropharyngeal exudate.   Eyes:      General: No scleral icterus.        Right eye: No discharge.         Left eye: No discharge.      Conjunctiva/sclera: Conjunctivae normal.      Pupils: Pupils are equal, round, and reactive to light.   Neck:      Thyroid: No thyromegaly.      Vascular: No JVD.      Trachea: No tracheal deviation.   Cardiovascular:      Rate and Rhythm: Normal rate and regular rhythm.      Heart sounds: Normal heart sounds. No murmur heard.     No friction rub. No gallop.   Pulmonary:      Effort: Pulmonary effort is normal. No respiratory distress.      Breath sounds: Normal breath sounds. No wheezing or rales.   Chest:      Chest wall: No tenderness.   Abdominal:      General: Bowel sounds are normal. There is no distension.      Palpations: Abdomen is soft. There is no mass.      Tenderness: There is abdominal tenderness (Epigastric abdominal tenderness) in the epigastric area. There is no guarding or rebound.   Musculoskeletal:         General: No tenderness or deformity. Normal range of motion.      Cervical back: Normal range of motion and neck supple. Pain with movement and muscular tenderness (Tenderness in the left upper neck and face posteriorly especially.) present.   Lymphadenopathy:      Cervical: No cervical adenopathy.   Skin:     General: Skin is warm and dry. "      Coloration: Skin is not pale.      Findings: No erythema or rash.   Neurological:      Mental Status: She is alert and oriented to person, place, and time.      Cranial Nerves: Dysarthria and facial asymmetry (left facial droop) present. No cranial nerve deficit.      Motor: Weakness (weakness of the facial  mus on the left) present. No abnormal muscle tone.      Coordination: Romberg sign positive. Coordination normal.      Deep Tendon Reflexes: Reflexes are normal and symmetric.      Comments: Cranial nerves 2-12 are not intact bilaterally with the  left facial droop, tongue deviation to the R  Muscle strength is 5/5 in all extremities  Sensation is intact in bilateral face and extremities  Rapid alternating movement and finger-to-nose pointing test intact   Deep tendon reflexes are 2+ bilaterally  Gait is intact         Psychiatric:         Behavior: Behavior normal.

## 2025-01-15 NOTE — ASSESSMENT & PLAN NOTE
-This likely put patient at risk for Bell's palsy  -Will continue to monitor clinically  -Continue with multivitamins

## 2025-01-17 ENCOUNTER — TELEPHONE (OUTPATIENT)
Age: 60
End: 2025-01-17

## 2025-01-17 DIAGNOSIS — G43.009 MIGRAINE WITHOUT AURA AND WITHOUT STATUS MIGRAINOSUS, NOT INTRACTABLE: Primary | ICD-10-CM

## 2025-01-17 RX ORDER — ACETAZOLAMIDE 125 MG/1
TABLET ORAL
Qty: 180 TABLET | Refills: 6 | Status: SHIPPED | OUTPATIENT
Start: 2025-01-17

## 2025-01-17 NOTE — TELEPHONE ENCOUNTER
Called and advised pt of all of the below. She verbalized understanding. Pt states that she is agreeable to take diamox 12 mg every 4 hours but she does not have it at home. She only has the 500 mg tabs. Requesting script for the 125 mg tabs be sent to [facility] pharmacy on file.    Pt states that she started the prednisone and the valtrex that her PCP prescribed    Pt states that she will f/u w/ sleep medicine    Attn clerical team and urgent add on team  Dr Garcia is requesting sooner appt. Pls see below    thanks

## 2025-01-17 NOTE — TELEPHONE ENCOUNTER
1/17/25 - No sooner appts available before her upcoming virtual appt for 1/28/25 at 130 - Added to Wait List     Pt does not have a HFU appt. Scheduled

## 2025-01-17 NOTE — TELEPHONE ENCOUNTER
----- Message from Natali Garcia MD sent at 1/15/2025  5:15 PM EST -----  I updated PCP that in neurology at least, physicians no longer have control over our schedules to be able to get patients in sooner.    Could you please reach out to her to see if she would like me to send anything in for migraine in the interim?  Even though she did not like acetazolamide/Diamox in the long-term it could help in this instance 125 mg every 4 hours if she still has it at home.  Steroids also would be the most likely thing to help and it looks like PCP just prescribed those today thankfully.  Certainly if anyone find a sooner appointment I would love to see her sooner for hospital follow-up.  Ideally more held spots for such instances would be helpful as well.  Treating the sleep apnea would be the most likely thing to prevent this in my opinion and I absolutely recommend following up on that as well.  ----- Message -----  From: Lillian Lai DO  Sent: 1/15/2025  12:17 PM EST  To: Natali Garcia MD    Hi Dr Garcia,  Our mutual patient Ms Villafuerte was recently diagnosed with [place] palsy of the left side of the face with numbness of the left hand and negative head CT and has been having persistent migraine headaches as well and her current medications are not working.  She has an appointment with you on 1/28/25.   Please can you try to get her in to see you sooner?

## 2025-01-22 ENCOUNTER — TELEPHONE (OUTPATIENT)
Age: 60
End: 2025-01-22

## 2025-01-22 ENCOUNTER — OFFICE VISIT (OUTPATIENT)
Dept: INTERNAL MEDICINE CLINIC | Age: 60
End: 2025-01-22
Payer: MEDICARE

## 2025-01-22 VITALS
WEIGHT: 157 LBS | SYSTOLIC BLOOD PRESSURE: 120 MMHG | DIASTOLIC BLOOD PRESSURE: 88 MMHG | HEIGHT: 64 IN | TEMPERATURE: 97.6 F | OXYGEN SATURATION: 97 % | BODY MASS INDEX: 26.8 KG/M2 | HEART RATE: 97 BPM

## 2025-01-22 DIAGNOSIS — B37.0 ORAL CANDIDA: Primary | ICD-10-CM

## 2025-01-22 DIAGNOSIS — G51.0 BELL'S PALSY: ICD-10-CM

## 2025-01-22 DIAGNOSIS — R53.83 OTHER FATIGUE: ICD-10-CM

## 2025-01-22 LAB — GLUCOSE SERPL-MCNC: 86 MG/DL (ref 65–140)

## 2025-01-22 PROCEDURE — 99214 OFFICE O/P EST MOD 30 MIN: CPT | Performed by: PHYSICIAN ASSISTANT

## 2025-01-22 RX ORDER — CLOTRIMAZOLE 10 MG/1
10 LOZENGE ORAL 4 TIMES DAILY
Qty: 40 TABLET | Refills: 0 | Status: SHIPPED | OUTPATIENT
Start: 2025-01-22 | End: 2025-02-01

## 2025-01-22 RX ORDER — FLUCONAZOLE 100 MG/1
100 TABLET ORAL DAILY
Qty: 5 TABLET | Refills: 0 | Status: SHIPPED | OUTPATIENT
Start: 2025-01-22 | End: 2025-01-27

## 2025-01-22 NOTE — PROGRESS NOTES
"Name: Baylee Villafuerte      : 1965      MRN: [id number]  Encounter Provider: Katia Willis PA-C  Encounter Date: 2025   Encounter department: [facility]  :  Assessment & Plan  Oral candida    Orders:    clotrimazole (MYCELEX) 10 mg jose; Take 1 tablet (10 mg total) by mouth 4 (four) times a day for 10 days    fluconazole (DIFLUCAN) 100 mg tablet; Take 1 tablet (100 mg total) by mouth daily for 5 days    Bell's palsy  Check lyme  Recommend PT    Orders:    Fingerstick Glucose (POCT)    Ambulatory Referral to Physical Therapy; Future    Lyme Total AB W Reflex to IGM/IGG; Future    Other fatigue  Likely secondary to taper off of prednisone  Now off for 2 days - to remain off  BS stable, labs reviewed with pt     Orders:    Fingerstick Glucose (POCT)    Lyme Total AB W Reflex to IGM/IGG; Future    Pt advised to f/u in 1 week if sx not improved  Has reg scheduled neuro f/u next week and will discuss  Encouraged PT        History of Present Illness   58 y/o female with hx of sjogrens, RA presents for c/o mouth sores and \"thrush\" as well as fatigue and dehydration since dx with bell's palsy  Pt reports she has had difficulty hydrating due to facial droop  ED notes reviewed  Pt took prednisone 40mg bid as her pharmacy printed the wrong instruction on the bottle and then realized this and has not been of pred since Monday  She reports she developed a fine rash on her chest and face and sores in her mouth   Pt had labs done previously ordered by rheum yesterday and show elevation in creatinine to 1.15 (baseline 0.8)             Review of Systems   Constitutional:  Positive for activity change and fatigue. Negative for appetite change, diaphoresis and fever.   HENT:  Positive for mouth sores. Negative for congestion and sore throat.    Eyes:  Negative for pain and redness.   Respiratory:  Negative for cough and shortness of breath.    Cardiovascular:  Negative for " "chest pain and leg swelling.   Gastrointestinal:  Negative for abdominal pain, constipation, diarrhea and nausea.   Genitourinary:  Negative for dysuria, enuresis and flank pain.   Musculoskeletal:  Negative for arthralgias and back pain.   Skin:  Positive for rash (fading rash on chest). Negative for wound.   Allergic/Immunologic: Negative for environmental allergies and food allergies.   Neurological:  Positive for weakness (L side face) and numbness (L lower face). Negative for dizziness and headaches.       Objective   /88 (BP Location: Left arm, Patient Position: Sitting, Cuff Size: Standard)   Pulse 97   Temp 97.6 °F (36.4 °C) (Temporal)   Ht 5' 4\" (1.626 m)   Wt 71.2 kg (157 lb)   SpO2 97%   BMI 26.95 kg/m²      Physical Exam  Vitals reviewed.   Constitutional:       General: She is not in acute distress.  HENT:      Head: Normocephalic and atraumatic.      Right Ear: Tympanic membrane, ear canal and external ear normal.      Left Ear: Tympanic membrane, ear canal and external ear normal.      Nose: Nose normal.      Mouth/Throat:      Pharynx: Posterior oropharyngeal erythema present.      Comments: White plaques on inner oral mucosa and tongue   Eyes:      Conjunctiva/sclera: Conjunctivae normal.      Pupils: Pupils are equal, round, and reactive to light.   Cardiovascular:      Rate and Rhythm: Normal rate and regular rhythm.   Pulmonary:      Effort: Pulmonary effort is normal. No respiratory distress.      Breath sounds: Normal breath sounds. No wheezing or rales.   Musculoskeletal:         General: Normal range of motion.      Cervical back: Normal range of motion. No rigidity.      Right lower leg: No edema.      Left lower leg: No edema.   Skin:     Coloration: Skin is not jaundiced.      Findings: Rash (fading fine pink rash on chest, small few pustules on chin mostly healed) present. No bruising.   Neurological:      Mental Status: She is alert.      Sensory: Sensory deficit present.     "  Motor: Weakness (L lower facial muscles/left facial droop) present.

## 2025-01-22 NOTE — TELEPHONE ENCOUNTER
Patient called stating that from 1/13-1/15 she was taking wrong dose of Prednisone that was prescribed from ED.  It was supposed to be 80mg day but she was taking 160 by accident during those days.  Patient is currently taking dose PCP recommended 60mg day.  But she is concerned about side effect of previous dosage.  She has thrush in mouth and rash on chin. Please advise and contact patient.

## 2025-01-28 ENCOUNTER — TELEMEDICINE (OUTPATIENT)
Dept: NEUROLOGY | Facility: CLINIC | Age: 60
End: 2025-01-28
Payer: MEDICARE

## 2025-01-28 DIAGNOSIS — G43.009 MIGRAINE WITHOUT AURA AND WITHOUT STATUS MIGRAINOSUS, NOT INTRACTABLE: Primary | ICD-10-CM

## 2025-01-28 PROCEDURE — 99214 OFFICE O/P EST MOD 30 MIN: CPT | Performed by: PSYCHIATRY & NEUROLOGY

## 2025-01-28 RX ORDER — DIVALPROEX SODIUM 500 MG/1
TABLET, DELAYED RELEASE ORAL
Qty: 15 TABLET | Refills: 0 | Status: SHIPPED | OUTPATIENT
Start: 2025-01-28

## 2025-01-28 RX ORDER — DULOXETIN HYDROCHLORIDE 60 MG/1
60 CAPSULE, DELAYED RELEASE ORAL DAILY
COMMUNITY
Start: 2025-01-19

## 2025-01-28 RX ORDER — DEXAMETHASONE 4 MG/1
TABLET ORAL
Qty: 5 TABLET | Refills: 0 | Status: SHIPPED | OUTPATIENT
Start: 2025-01-28

## 2025-01-28 NOTE — PATIENT INSTRUCTIONS
If symptoms are not improving, can consider MRI brain although currently meets criteria for Bell's palsy which is not thought to be centrally located    Agree with PT and acupuncture    Reach out to the people prescribing your metoclopramide/Reglan and asked them how they would like you to come off of it for a few weeks just to make sure it is not causing tar dive dyskinesia or other side effects    Either follow-up with sleep medicine for treatment of the sleep apnea or follow-up with ENT to assess for inspire device as untreated sleep apnea does raise your risk of stroke.    To try and break this migraine:  -     dexamethasone (DECADRON) 4 mg tablet; 4 mg p.o. with breakfast for 1 to 5 days for unrelenting migraine  -     divalproex sodium (Depakote) 500 mg DR tablet; 500 mg p.o. nightly for 1-15 nights for unrelenting migraine      Headache/migraine treatment:   Abortive medications (for immediate treatment of a headache):   It is ok to take ibuprofen, acetaminophen or naproxen (Advil, Tylenol,  Aleve, Excedrin) if they help your headaches you should limit these to No more than 3 times a week to avoid medication overuse/rebound headaches.      For your more moderate to severe migraines take this medication early   -     rimegepant sulfate (Nurtec) 75 mg TBDP; Take one NURTEC 75 mg at onset under tongue. Limit 1 in 24 hours.    - will need a prior Auth which may take 1 to 2 weeks and if you do not hear anything let us know and there is a coupon card on the website for the co-pay.  If your insurance prefers Ubrelvy it is a similar medication I will send that in instead.    Rizatriptan 10 mg tabs - take one at the onset of headache. May repeat one time after 1-2 hours if pain has not resolved.   (Max 2 a day and 9 a month)        Prescription preventive medications for headaches/migraines   (to take every day to help prevent headaches - not to take at the time of headache):  [x]Emgality/Galcanezumab  120 mg  injections every 28 days     Keep out of direct sunlight. Prior to administration, allow to come to room temperature for 30 minutes. Do not warm using a heat source (eg, microwave or hot water). Do not shake. Administer in abdomen (avoiding 2 inches around the navel), thigh, upper arm, or buttocks avoiding areas of skin that are tender, bruised, red or hard. Deliver entire contents of single-use prefilled pen or syringe.       *Typically these types of medications take time untill you see the benefit, although some may see improvement in days, often it may take weeks, especially if the medication is being titrated up to a beneficial level. Please contact us if there are any concerns or questions regarding the medication.         Lifestyle Recommendations:  [x] SLEEP - Maintain a regular sleep schedule: Adults need at least 7-8 hours of uninterrupted a night. Maintain good sleep hygiene:  Going to bed and waking up at consistent times, avoiding excessive daytime naps, avoiding caffeinated beverages in the evening, avoid excessive stimulation in the evening and generally using bed primarily for sleeping.  One hour before bedtime would recommend turning lights down lower, decreasing your activity (may read quietly, listen to music at a low volume). When you get into bed, should eliminate all technology (no texting, emailing, playing with your phone, iPad or tablet in bed).  [x] HYDRATION - Maintain good hydration.  Drink  2L of fluid a day (4 typical small water bottles)  [x] DIET - Maintain good nutrition. In particular don't skip meals and try and eat healthy balanced meals regularly.  [x] TRIGGERS - Look for other triggers and avoid them: Limit caffeine to 1-2 cups a day or less. Avoid dietary triggers that you have noticed bring on your headaches (this could include aged cheese, peanuts, MSG, aspartame and nitrates).  [x] EXERCISE - physical exercise as we all know is good for you in many ways, and not only is good  for your heart, but also is beneficial for your mental health, cognitive health and  chronic pain/headaches. I would encourage at the least 5 days of physical exercise weekly for at least 30 minutes.      Education and Follow-up  [x] Please call with any questions or concerns. Of course if any new concerning symptoms go to the emergency department.  [x] Follow up 4 weeks, sooner if needed

## 2025-01-28 NOTE — ASSESSMENT & PLAN NOTE
Assessment/Plan:   Baylee Reza is a very pleasant 59 y.o. female with a past medical history that includes migraines, anxiety, depression, arthralgia, HTN, GERD,  IBS, hypercholesteremia, iron deficiency anemia, kidney stones, osteopenia, insomnia, bells palsy referred here for evaluation of headache.  My initial evaluation 8/19/2020     Migraine without aura without status migrainosus, not intractable  Features of idiopathic intracranial hypertension on imaging not meeting criteria for IIH without papilledema  DIPTI not currently using CPAP (home study, BRANDI 16.1, supine 18.8, non supine 3.5, Oxygen down to 75%, while on acetazolamide/Diamox which also can treat sleep apnea and likely makes this number lower than it is) -following with sleep medicine -8/27/2024 also placed referral to ENT to consider inspire since no longer using CPAP  She reports a long history of headaches and migraines that have varied in frequency and severity throughout life.  Pain is typically left frontal/retro-orbital and sometimes in the back.  She denies aura and reports typical associated migrainous features.  - as of 8/19/2020: chronic daily headaches and Migraines 3-4 days per week for the past year or so  - as of 1/29/2021:   Migraines have drastically improved to only 2-3 per month.  She did not start any new preventative and since she is so improved will hold off on adding anything at this time.  Continue sumatriptan 100 mg as needed for abortive.  - as of 8/27/2021: She reports Migraines a little worse to about 4 migraines a month, willing to add preventative as sumatriptan does not always work well, trial of emgality.   - as of 1/27/2022: Emgality helped a little initially after first loading dose and then pharmacy said she did not have refills and she did not call us so has not had since Sept. Now under a lot of stress, getting about 4 migraines a month, daily mild migraines/headaches. Resume emgality one shot monthly.  Continue sumatriptan for abortive.   - as of 7/28/2022: She reports significant improvement on emgality with decreased frequency and severity of migraines to mild 4 a month, and 2 severe a month. Did not realize she could still take sumatriptan so was just taking OTC meds, refilled sumatriptan/imitrex. toradol IM today for migraine since yesterday.  - as of 1/20/2023: She was doing great on Emgality, but last dose was in September or October due to mom passing away and had to leave town to help. Will resume emgality monthly as migraines are again 2-3 days a week, trial of rizatriptan in place of sumatriptan.   - as of 6/21/2023: She reports improvement on emgality, but not as significant improvement as would be expected and she believes due to the lapse in getting it and we discussed I believe it may be due to DIPTI causing subclinical IIH picture and highly recommend trial of acetazolamide/Diamox.  I believe subclinical IIH actually may be contributing to more symptoms than realized due to tight cerebellar anatomy.  Sleep study to evaluate for sleep apnea although home study can miss it, will likely need in lab.  We discussed this may be why she needs to sleep aids, blood pressure pill and it may be contributing to her vision issues that she has been told are cataracts only.  We discussed sleep apnea can cause significant morbidity/mortality issues if untreated.  Trial of rizatriptan works better than sumatriptan although she is not taking it lately due to starting methotrexate and was told not to take anything on it.  Review of recent CT shows multiple signs of increased intracranial pressure.   - as of 9/21/23: She reports improvement since last visit with on average 2 headaches a week due to emgality decreasing her migraines and acetazolamide 125 mg TID decreasing suspected subtle increased intracranial pressure contributing, following closely with eye doctors and upcoming cataract surgery and no history of  papilledema to call this IIH per standard criteria. PCP stopped her chlorthalidone due to BP normalizing. She reports being able to sleep longer and nap more, and I again encouraged scheduling sleep study, interestingly diamox also treats some forms of sleep apnea. Will increase diamox as tolerated to 125 mg 4 times a day while a wake to try and prevent wearing off and add night time dosing if needed when it would be wearing off the longest.  - as of 11/14/2023: She reports migraines remain improved on Emgality with on average 3 a week and rizatriptan typically helps without side effects.  She reports acetazolamide/Diamox 125 mg every 4 hours is helping although likely not enough especially since she is hardly sleeping at all and we discussed switching to 500 mg twice daily to see if this can help more with less wearing off effect and rebound high blood pressure and can help last throughout the night.  She indeed was diagnosed with sleep apnea at a moderate level and we discussed I highly recommend CPAP and nothing less for treatment despite being positional and she will be following up with sleep medicine in January.  In the meantime if needed can sleep on side or propped up in chair.  Following with eye doctor with persistent vision issues and he consistently does not see papilledema or signs that this would be related although we discussed I think it may be potentially impacting cataracts and she does have upcoming cataract surgery.   - as of 2/22/2024: She has had a difficult time tolerating CPAP thus far and we discussed some of the symptoms she thinks are related to the CPAP side effects sound potentially related to acetazolamide wearing off at 8-hour lucia rather than 12 and could consider increasing to 500 mg long-acting every 8 hours however she reports bothersome side effects and therefore will instead transition to previous 125 mg every 4 hours for 1 to 2 weeks and then can take as needed if needed.  I  suspect her headaches could get worse off of this medication, but treating the sleep apnea would help with this, and she received a new mask today that she is going to try.  Waking up with headaches also likely related to wearing off effect, otherwise 2-3 a week.  Emgality has decreased migraine days and will continue.  Rizatriptan worked more in the past and will add trial of Nurtec to see if this can help more and also help with prevention the more she takes it as well as with wearing off of emgality.   - as of 2024: She reports 1-2 migraines a week and rizatriptan helps sometimes and not others and she never received the Nurtec trial.  This is off of Emgality the last 2 months with last dose in March.  It appears the prior authorization  in January and we were not aware (but got through March due to 3 month supply), recommended she let us know in the future and will have the nursing team work on the prior Auth now.  Will also have the nursing team look into why the prior authorization was never sent to us for Nurtec can help with this as the more she takes Nurtec it also can help with prevention. She followed up with sleep medicine, they changed her from nasal pillow to fullface, decrease the pressure and is now using her CPAP more regularly, although may not use it some nights on average 7 and half hours with residual AHI 2.2.   - as of 2024: She reports headaches and migraines have improved since last visit and now on average once a week that improves with Nurtec.  She is not currently treating her sleep apnea and we discussed the potential morbidity/mortality of untreated sleep apnea especially at the moderate level with oxygen dropping to 75% and likely underestimated while on acetazolamide/Diamox at the time of the study.  I would recommend she make a follow-up with sleep medicine to discuss sleep apnea being untreated and other options.  She is open to referral to Dr. Munoz to discuss  inspire as an alternative as she already wanted to follow-up with ENT as well.  Continue Emgality for migraine prevention.  Since last visit others added duloxetine/Cymbalta for fibromyalgia and she will be increasing it from 30 mg to 60 mg soon.   - as of 1/28/2025: She reports headaches have been exacerbated by recent diagnosis of Bell's palsy 1/13/2025 with complete paralysis of the left side of the face including forehead.  She was evaluated in ER where noncontrast head CT was read as no acute pathology, given Valtrex and prednisone and PCP finish the prednisone course, she unfortunately then had oral thrush and is establishing care with physical therapy in 2 days for their assistance with the Bell's palsy as the left facial weakness remains significant.  This has been associated with a severe persistent headache with migrainous features and since she had tolerated and found acetazolamide helpful in the past we added this back 1/22/2025 (took 5 days to get) and I recommend continuing at least while headache is this bad due to the untreated sleep apnea which I still would recommend following up with ENT for consideration of inspire device.  We discussed we have multiple other medications to try and break this cycle and she would like to proceed with trial of Depakote 500 mg at night and will refill dexamethasone for if severe although she is aware of the risk/side effects.  Continue to follow with PCP for overall health care otherwise.  Of note she reports they held her methotrexate 1/7/2025 for 2-month trial, duloxetine/Cymbalta one of the newer meds started this summer, metoclopramide/reglan 10 mg QID also started around November or December 2024 for gastroparesis, reports she was starting to get a take of her right lip prior to the Bell's palsy.  We discussed could consider MRI brain if there are new or concerning features at any time.  Nurtec typically works for her migraines, not this unrelenting 1 and  rizatriptan did not help either.  ER if any new or concerning symptoms.    Workup:  -Noncontrast head CT 4/10/2023:  No acute intracranial abnormality. *we discussed as of my retrospective review 9/21/23, the nonspecific signs that I also see on MRI brain 2019 as below  - CTA head and neck with without contrast 07/23/2020:  1. No evidence of acute intracranial hemorrhage.  2. No evidence of hemodynamic significant stenosis, aneurysm or dissection.  - MRI brain with without contrast 08/01/2019:  A few nonspecific white matter lesions.  No acute infarction, intracranial hemorrhage or mass.*As of my retrospective review 2/22/2024 she has a partially empty sella that is nearly empty, bilateral optic nerve sheath prominence and tortuosity, tighter ventricles than expected for age 58, cerebellar tonsils overlie the foramen magnum with tight junction right greater than left without Chiari  - If she is open to it, we can obtain follow up MRI Brain to further evaluate at anytime     Preventative:  - we discussed headache hygiene and lifestyle factors that may improve headaches  -  continue Galcanezumab-gnlm 120 MG/ML SOAJ; Inject 120 mg under the skin every 28 days (3 month supply so she doesn't have to wait for the prescription delayed every month). Discussed proper use, possible side effects and risks.   Methotrexate since June 2023 and then increased 8/8/23, duloxetine/cymbalta in am 30 mg in July 2024 and this week will increase to 60 mg (for fibromyalgia pain - first two weeks cramps and N/V SE), Vit B12 1000 mcg, estradiol/estrace vaginal cream started since last visit and not used yet, Vit D 50,000 units, atorvastatin/lipitor 10 mg, Rabeprazole/Aciphex 40 mg twice daily for GERD   - On through other providers:  mirtazapine 45 mg (through PCP, no longer using ambien at all in year), folic acid,  hydroxychloriquine for years now,  ), duloxetine/cymbalta in am 30 mg in July 2024 and 8/27/2024 increased to 60 mg (for  fibromyalgia pain - first two weeks cramps and N/V SE), Vit B12 1000 mcg, Vit D 50,000, atorvastatin/lipitor 10 mg, metoclopramide/reglan 10 mg QID for 3 months as of 1/28/25 - prior to bells palsy was getting a tic on her right lip   - past/failed:  Amitriptyline, mirtazapine, topiramate,  Ambien, aimovig contraindicated due to constiptation and latex allergy, Chlorthalidone, acetazolamide SE dry mouth (on top of baseline dry mouth that she has just as bad off of it due to Sjogren's) and tingling bothersome and wore off at 8 hours but couldn't feel she could increase to TID due to SE  - future options:   alternative CGRP med, botox     Abortive:  - discussed not taking over-the-counter or prescription pain medications more than 3 days per week to prevent medication overuse/rebound headache  - through other providers:  Ondansetron helps nausea  -   rimegepant sulfate (Nurtec) 75 mg TBDP; Take one NURTEC 75 mg at onset under tongue. Limit 1 in 24 hours. Discussed proper use, possible side effects and risks.  -Rizatriptan 10 mg helps the most severe migraines, takes infrequently. Discussed proper use, possible side effects and risks.  - past/failed:  Sumatriptan 50 mg 100 mg, rizatriptan   - past/helped: toradol IM helped, steroids tolerated - prefers not to have it due to stomach irritation   - future options:  prochlorperazine, metoclopramide, Toradol IM or p.o., could consider trial for 5 days of Depakote or dexamethasone 4 prolonged migraine, ubrelvy, reyvow     Patient instructions      If symptoms are not improving, can consider MRI brain although currently meets criteria for Bell's palsy which is not thought to be centrally located    Agree with PT and acupuncture    Reach out to the people prescribing your metoclopramide/Reglan and asked them how they would like you to come off of it for a few weeks just to make sure it is not causing tar dive dyskinesia or other side effects    Either follow-up with sleep  medicine for treatment of the sleep apnea or follow-up with ENT to assess for inspire device as untreated sleep apnea does raise your risk of stroke.    To try and break this migraine:  -     dexamethasone (DECADRON) 4 mg tablet; 4 mg p.o. with breakfast for 1 to 5 days for unrelenting migraine  -     divalproex sodium (Depakote) 500 mg DR tablet; 500 mg p.o. nightly for 1-15 nights for unrelenting migraine      Headache/migraine treatment:   Abortive medications (for immediate treatment of a headache):   It is ok to take ibuprofen, acetaminophen or naproxen (Advil, Tylenol,  Aleve, Excedrin) if they help your headaches you should limit these to No more than 3 times a week to avoid medication overuse/rebound headaches.      For your more moderate to severe migraines take this medication early   -     rimegepant sulfate (Nurtec) 75 mg TBDP; Take one NURTEC 75 mg at onset under tongue. Limit 1 in 24 hours.    - will need a prior Auth which may take 1 to 2 weeks and if you do not hear anything let us know and there is a coupon card on the website for the co-pay.  If your insurance prefers Ubrelvy it is a similar medication I will send that in instead.    Rizatriptan 10 mg tabs - take one at the onset of headache. May repeat one time after 1-2 hours if pain has not resolved.   (Max 2 a day and 9 a month)        Prescription preventive medications for headaches/migraines   (to take every day to help prevent headaches - not to take at the time of headache):  [x]Emgality/Galcanezumab  120 mg injections every 28 days     Keep out of direct sunlight. Prior to administration, allow to come to room temperature for 30 minutes. Do not warm using a heat source (eg, microwave or hot water). Do not shake. Administer in abdomen (avoiding 2 inches around the navel), thigh, upper arm, or buttocks avoiding areas of skin that are tender, bruised, red or hard. Deliver entire contents of single-use prefilled pen or syringe.        *Typically these types of medications take time untill you see the benefit, although some may see improvement in days, often it may take weeks, especially if the medication is being titrated up to a beneficial level. Please contact us if there are any concerns or questions regarding the medication.         Lifestyle Recommendations:  [x] SLEEP - Maintain a regular sleep schedule: Adults need at least 7-8 hours of uninterrupted a night. Maintain good sleep hygiene:  Going to bed and waking up at consistent times, avoiding excessive daytime naps, avoiding caffeinated beverages in the evening, avoid excessive stimulation in the evening and generally using bed primarily for sleeping.  One hour before bedtime would recommend turning lights down lower, decreasing your activity (may read quietly, listen to music at a low volume). When you get into bed, should eliminate all technology (no texting, emailing, playing with your phone, iPad or tablet in bed).  [x] HYDRATION - Maintain good hydration.  Drink  2L of fluid a day (4 typical small water bottles)  [x] DIET - Maintain good nutrition. In particular don't skip meals and try and eat healthy balanced meals regularly.  [x] TRIGGERS - Look for other triggers and avoid them: Limit caffeine to 1-2 cups a day or less. Avoid dietary triggers that you have noticed bring on your headaches (this could include aged cheese, peanuts, MSG, aspartame and nitrates).  [x] EXERCISE - physical exercise as we all know is good for you in many ways, and not only is good for your heart, but also is beneficial for your mental health, cognitive health and  chronic pain/headaches. I would encourage at the least 5 days of physical exercise weekly for at least 30 minutes.      Education and Follow-up  [x] Please call with any questions or concerns. Of course if any new concerning symptoms go to the emergency department.  [x] Follow up 4 weeks, sooner if needed   Orders:    dexamethasone  (DECADRON) 4 mg tablet; 4 mg p.o. with breakfast for 1 to 5 days for unrelenting migraine    divalproex sodium (Depakote) 500 mg DR tablet; 500 mg p.o. nightly for 1-15 nights for unrelenting migraine    acetaZOLAMIDE (DIAMOX) 125 mg tablet; 125 mg PO upon awakening and every 4 hours during the day for 5 doses

## 2025-01-28 NOTE — PROGRESS NOTES
Virtual Regular Visit  Name: Baylee Villafuerte      : 1965      MRN: [id number]  Encounter Provider: Natali Garcia MD  Encounter Date: 2025   Encounter department: NEUROLOGY [facility]      Verification of patient location:  Patient is located at Other in the following state in which I hold an active license [place] :  Assessment & Plan  Migraine without aura and without status migrainosus, not intractable    Assessment/Plan:   Baylee Reza is a very pleasant 59 y.o. female with a past medical history that includes migraines, anxiety, depression, arthralgia, HTN, GERD,  IBS, hypercholesteremia, iron deficiency anemia, kidney stones, osteopenia, insomnia, bells palsy referred here for evaluation of headache.  My initial evaluation 2020     Migraine without aura without status migrainosus, not intractable  Features of idiopathic intracranial hypertension on imaging not meeting criteria for IIH without papilledema  DIPTI not currently using CPAP (home study, BRANDI 16.1, supine 18.8, non supine 3.5, Oxygen down to 75%, while on acetazolamide/Diamox which also can treat sleep apnea and likely makes this number lower than it is) -following with sleep medicine -2024 also placed referral to ENT to consider inspire since no longer using CPAP  She reports a long history of headaches and migraines that have varied in frequency and severity throughout life.  Pain is typically left frontal/retro-orbital and sometimes in the back.  She denies aura and reports typical associated migrainous features.  - as of 2020: chronic daily headaches and Migraines 3-4 days per week for the past year or so  - as of 2021:   Migraines have drastically improved to only 2-3 per month.  She did not start any new preventative and since she is so improved will hold off on adding anything at this time.  Continue sumatriptan 100 mg as needed for abortive.  - as of 2021: She reports Migraines a  little worse to about 4 migraines a month, willing to add preventative as sumatriptan does not always work well, trial of emgality.   - as of 1/27/2022: Emgality helped a little initially after first loading dose and then pharmacy said she did not have refills and she did not call us so has not had since Sept. Now under a lot of stress, getting about 4 migraines a month, daily mild migraines/headaches. Resume emgality one shot monthly. Continue sumatriptan for abortive.   - as of 7/28/2022: She reports significant improvement on emgality with decreased frequency and severity of migraines to mild 4 a month, and 2 severe a month. Did not realize she could still take sumatriptan so was just taking OTC meds, refilled sumatriptan/imitrex. toradol IM today for migraine since yesterday.  - as of 1/20/2023: She was doing great on Emgality, but last dose was in September or October due to mom passing away and had to leave town to help. Will resume emgality monthly as migraines are again 2-3 days a week, trial of rizatriptan in place of sumatriptan.   - as of 6/21/2023: She reports improvement on emgality, but not as significant improvement as would be expected and she believes due to the lapse in getting it and we discussed I believe it may be due to DIPTI causing subclinical IIH picture and highly recommend trial of acetazolamide/Diamox.  I believe subclinical IIH actually may be contributing to more symptoms than realized due to tight cerebellar anatomy.  Sleep study to evaluate for sleep apnea although home study can miss it, will likely need in lab.  We discussed this may be why she needs to sleep aids, blood pressure pill and it may be contributing to her vision issues that she has been told are cataracts only.  We discussed sleep apnea can cause significant morbidity/mortality issues if untreated.  Trial of rizatriptan works better than sumatriptan although she is not taking it lately due to starting methotrexate and was  told not to take anything on it.  Review of recent CT shows multiple signs of increased intracranial pressure.   - as of 9/21/23: She reports improvement since last visit with on average 2 headaches a week due to emgality decreasing her migraines and acetazolamide 125 mg TID decreasing suspected subtle increased intracranial pressure contributing, following closely with eye doctors and upcoming cataract surgery and no history of papilledema to call this IIH per standard criteria. PCP stopped her chlorthalidone due to BP normalizing. She reports being able to sleep longer and nap more, and I again encouraged scheduling sleep study, interestingly diamox also treats some forms of sleep apnea. Will increase diamox as tolerated to 125 mg 4 times a day while a wake to try and prevent wearing off and add night time dosing if needed when it would be wearing off the longest.  - as of 11/14/2023: She reports migraines remain improved on Emgality with on average 3 a week and rizatriptan typically helps without side effects.  She reports acetazolamide/Diamox 125 mg every 4 hours is helping although likely not enough especially since she is hardly sleeping at all and we discussed switching to 500 mg twice daily to see if this can help more with less wearing off effect and rebound high blood pressure and can help last throughout the night.  She indeed was diagnosed with sleep apnea at a moderate level and we discussed I highly recommend CPAP and nothing less for treatment despite being positional and she will be following up with sleep medicine in January.  In the meantime if needed can sleep on side or propped up in chair.  Following with eye doctor with persistent vision issues and he consistently does not see papilledema or signs that this would be related although we discussed I think it may be potentially impacting cataracts and she does have upcoming cataract surgery.   - as of 2/22/2024: She has had a difficult time  tolerating CPAP thus far and we discussed some of the symptoms she thinks are related to the CPAP side effects sound potentially related to acetazolamide wearing off at 8-hour lucia rather than 12 and could consider increasing to 500 mg long-acting every 8 hours however she reports bothersome side effects and therefore will instead transition to previous 125 mg every 4 hours for 1 to 2 weeks and then can take as needed if needed.  I suspect her headaches could get worse off of this medication, but treating the sleep apnea would help with this, and she received a new mask today that she is going to try.  Waking up with headaches also likely related to wearing off effect, otherwise 2-3 a week.  Emgality has decreased migraine days and will continue.  Rizatriptan worked more in the past and will add trial of Nurtec to see if this can help more and also help with prevention the more she takes it as well as with wearing off of emgality.   - as of 2024: She reports 1-2 migraines a week and rizatriptan helps sometimes and not others and she never received the Nurtec trial.  This is off of Emgality the last 2 months with last dose in March.  It appears the prior authorization  in January and we were not aware (but got through March due to 3 month supply), recommended she let us know in the future and will have the nursing team work on the prior Auth now.  Will also have the nursing team look into why the prior authorization was never sent to us for Nurtec can help with this as the more she takes Nurtec it also can help with prevention. She followed up with sleep medicine, they changed her from nasal pillow to fullface, decrease the pressure and is now using her CPAP more regularly, although may not use it some nights on average 7 and half hours with residual AHI 2.2.   - as of 2024: She reports headaches and migraines have improved since last visit and now on average once a week that improves with Nurtec.   She is not currently treating her sleep apnea and we discussed the potential morbidity/mortality of untreated sleep apnea especially at the moderate level with oxygen dropping to 75% and likely underestimated while on acetazolamide/Diamox at the time of the study.  I would recommend she make a follow-up with sleep medicine to discuss sleep apnea being untreated and other options.  She is open to referral to Dr. Munoz to discuss inspire as an alternative as she already wanted to follow-up with ENT as well.  Continue Emgality for migraine prevention.  Since last visit others added duloxetine/Cymbalta for fibromyalgia and she will be increasing it from 30 mg to 60 mg soon.   - as of 1/28/2025: She reports headaches have been exacerbated by recent diagnosis of Bell's palsy 1/13/2025 with complete paralysis of the left side of the face including forehead.  She was evaluated in ER where noncontrast head CT was read as no acute pathology, given Valtrex and prednisone and PCP finish the prednisone course, she unfortunately then had oral thrush and is establishing care with physical therapy in 2 days for their assistance with the Bell's palsy as the left facial weakness remains significant.  This has been associated with a severe persistent headache with migrainous features and since she had tolerated and found acetazolamide helpful in the past we added this back 1/22/2025 (took 5 days to get) and I recommend continuing at least while headache is this bad due to the untreated sleep apnea which I still would recommend following up with ENT for consideration of inspire device.  We discussed we have multiple other medications to try and break this cycle and she would like to proceed with trial of Depakote 500 mg at night and will refill dexamethasone for if severe although she is aware of the risk/side effects.  Continue to follow with PCP for overall health care otherwise.  Of note she reports they held her methotrexate  1/7/2025 for 2-month trial, duloxetine/Cymbalta one of the newer meds started this summer, metoclopramide/reglan 10 mg QID also started around November or December 2024 for gastroparesis, reports she was starting to get a take of her right lip prior to the Bell's palsy.  We discussed could consider MRI brain if there are new or concerning features at any time.  Nurtec typically works for her migraines, not this unrelenting 1 and rizatriptan did not help either.  ER if any new or concerning symptoms.    Workup:  -Noncontrast head CT 4/10/2023:  No acute intracranial abnormality. *we discussed as of my retrospective review 9/21/23, the nonspecific signs that I also see on MRI brain 2019 as below  - CTA head and neck with without contrast 07/23/2020:  1. No evidence of acute intracranial hemorrhage.  2. No evidence of hemodynamic significant stenosis, aneurysm or dissection.  - MRI brain with without contrast 08/01/2019:  A few nonspecific white matter lesions.  No acute infarction, intracranial hemorrhage or mass.*As of my retrospective review 2/22/2024 she has a partially empty sella that is nearly empty, bilateral optic nerve sheath prominence and tortuosity, tighter ventricles than expected for age 58, cerebellar tonsils overlie the foramen magnum with tight junction right greater than left without Chiari  - If she is open to it, we can obtain follow up MRI Brain to further evaluate at anytime     Preventative:  - we discussed headache hygiene and lifestyle factors that may improve headaches  -  continue Galcanezumab-gnlm 120 MG/ML SOAJ; Inject 120 mg under the skin every 28 days (3 month supply so she doesn't have to wait for the prescription delayed every month). Discussed proper use, possible side effects and risks.   Methotrexate since June 2023 and then increased 8/8/23, duloxetine/cymbalta in am 30 mg in July 2024 and this week will increase to 60 mg (for fibromyalgia pain - first two weeks cramps and N/V  SE), Vit B12 1000 mcg, estradiol/estrace vaginal cream started since last visit and not used yet, Vit D 50,000 units, atorvastatin/lipitor 10 mg, Rabeprazole/Aciphex 40 mg twice daily for GERD   - On through other providers:  mirtazapine 45 mg (through PCP, no longer using ambien at all in year), folic acid,  hydroxychloriquine for years now,  ), duloxetine/cymbalta in am 30 mg in July 2024 and 8/27/2024 increased to 60 mg (for fibromyalgia pain - first two weeks cramps and N/V SE), Vit B12 1000 mcg, Vit D 50,000, atorvastatin/lipitor 10 mg, metoclopramide/reglan 10 mg QID for 3 months as of 1/28/25 - prior to bells palsy was getting a tic on her right lip   - past/failed:  Amitriptyline, mirtazapine, topiramate,  Ambien, aimovig contraindicated due to constiptation and latex allergy, Chlorthalidone, acetazolamide SE dry mouth (on top of baseline dry mouth that she has just as bad off of it due to Sjogren's) and tingling bothersome and wore off at 8 hours but couldn't feel she could increase to TID due to SE  - future options:   alternative CGRP med, botox     Abortive:  - discussed not taking over-the-counter or prescription pain medications more than 3 days per week to prevent medication overuse/rebound headache  - through other providers:  Ondansetron helps nausea  -   rimegepant sulfate (Nurtec) 75 mg TBDP; Take one NURTEC 75 mg at onset under tongue. Limit 1 in 24 hours. Discussed proper use, possible side effects and risks.  -Rizatriptan 10 mg helps the most severe migraines, takes infrequently. Discussed proper use, possible side effects and risks.  - past/failed:  Sumatriptan 50 mg 100 mg, rizatriptan   - past/helped: toradol IM helped, steroids tolerated - prefers not to have it due to stomach irritation   - future options:  prochlorperazine, metoclopramide, Toradol IM or p.o., could consider trial for 5 days of Depakote or dexamethasone 4 prolonged migraine, ubrelvy, reyvow     Patient instructions      If  symptoms are not improving, can consider MRI brain although currently meets criteria for Bell's palsy which is not thought to be centrally located    Agree with PT and acupuncture    Reach out to the people prescribing your metoclopramide/Reglan and asked them how they would like you to come off of it for a few weeks just to make sure it is not causing tar dive dyskinesia or other side effects    Either follow-up with sleep medicine for treatment of the sleep apnea or follow-up with ENT to assess for inspire device as untreated sleep apnea does raise your risk of stroke.    To try and break this migraine:  -     dexamethasone (DECADRON) 4 mg tablet; 4 mg p.o. with breakfast for 1 to 5 days for unrelenting migraine  -     divalproex sodium (Depakote) 500 mg DR tablet; 500 mg p.o. nightly for 1-15 nights for unrelenting migraine      Headache/migraine treatment:   Abortive medications (for immediate treatment of a headache):   It is ok to take ibuprofen, acetaminophen or naproxen (Advil, Tylenol,  Aleve, Excedrin) if they help your headaches you should limit these to No more than 3 times a week to avoid medication overuse/rebound headaches.      For your more moderate to severe migraines take this medication early   -     rimegepant sulfate (Nurtec) 75 mg TBDP; Take one NURTEC 75 mg at onset under tongue. Limit 1 in 24 hours.    - will need a prior Auth which may take 1 to 2 weeks and if you do not hear anything let us know and there is a coupon card on the website for the co-pay.  If your insurance prefers Ubrelvy it is a similar medication I will send that in instead.    Rizatriptan 10 mg tabs - take one at the onset of headache. May repeat one time after 1-2 hours if pain has not resolved.   (Max 2 a day and 9 a month)        Prescription preventive medications for headaches/migraines   (to take every day to help prevent headaches - not to take at the time of headache):  [x]Emgality/Galcanezumab  120 mg injections  every 28 days     Keep out of direct sunlight. Prior to administration, allow to come to room temperature for 30 minutes. Do not warm using a heat source (eg, microwave or hot water). Do not shake. Administer in abdomen (avoiding 2 inches around the navel), thigh, upper arm, or buttocks avoiding areas of skin that are tender, bruised, red or hard. Deliver entire contents of single-use prefilled pen or syringe.       *Typically these types of medications take time untill you see the benefit, although some may see improvement in days, often it may take weeks, especially if the medication is being titrated up to a beneficial level. Please contact us if there are any concerns or questions regarding the medication.         Lifestyle Recommendations:  [x] SLEEP - Maintain a regular sleep schedule: Adults need at least 7-8 hours of uninterrupted a night. Maintain good sleep hygiene:  Going to bed and waking up at consistent times, avoiding excessive daytime naps, avoiding caffeinated beverages in the evening, avoid excessive stimulation in the evening and generally using bed primarily for sleeping.  One hour before bedtime would recommend turning lights down lower, decreasing your activity (may read quietly, listen to music at a low volume). When you get into bed, should eliminate all technology (no texting, emailing, playing with your phone, iPad or tablet in bed).  [x] HYDRATION - Maintain good hydration.  Drink  2L of fluid a day (4 typical small water bottles)  [x] DIET - Maintain good nutrition. In particular don't skip meals and try and eat healthy balanced meals regularly.  [x] TRIGGERS - Look for other triggers and avoid them: Limit caffeine to 1-2 cups a day or less. Avoid dietary triggers that you have noticed bring on your headaches (this could include aged cheese, peanuts, MSG, aspartame and nitrates).  [x] EXERCISE - physical exercise as we all know is good for you in many ways, and not only is good for your  heart, but also is beneficial for your mental health, cognitive health and  chronic pain/headaches. I would encourage at the least 5 days of physical exercise weekly for at least 30 minutes.      Education and Follow-up  [x] Please call with any questions or concerns. Of course if any new concerning symptoms go to the emergency department.  [x] Follow up 4 weeks, sooner if needed   Orders:    dexamethasone (DECADRON) 4 mg tablet; 4 mg p.o. with breakfast for 1 to 5 days for unrelenting migraine    divalproex sodium (Depakote) 500 mg DR tablet; 500 mg p.o. nightly for 1-15 nights for unrelenting migraine    acetaZOLAMIDE (DIAMOX) 125 mg tablet; 125 mg PO upon awakening and every 4 hours during the day for 5 doses        Encounter provider Natali Garcia MD    The patient was identified by name and date of birth. Baylee Villafuerte was informed that this is a telemedicine visit and that the visit is being conducted through the Epic Embedded platform. She agrees to proceed..  My office door was closed. No one else was in the room.  She acknowledged consent and understanding of privacy and security of the video platform. The patient has agreed to participate and understands they can discontinue the visit at any time.    Patient is aware this is a billable service.            History of Present Illness     Interval history as of 1/28/2025  - Of note/managed by others:   - 1/13/25 woke up and breakfast and when she had juice she noticed that was dripping out of the left cheek and then when she had dinner she had a massive headache and difficulty eating and found left face drooping and tongue twisted and couldn't speak properly, ER thinking it was a stroke, CT no acute pathology, they dx bells palsy, valtrex, 40 mg prednisone (pharmacy gave her 80 mg and got thrush and rash on face - took 3 days), followed up with PCP and she gave her the rest of the 7 days   - Since last visit followed up with heme-onc for iron  deficiency, nuc med scan for nausea, Derm for seborrheic keratosis Cherry angioma lentigo, seborrheic dermatitis, lichenoid keratosis, GI for gastroparesis IBS with constipation had DEXA scan, followed up with PCP prior to Bell's palsy above and later followed up with PCP again as she developed the rash, and fatigue - since last visit lost 12 lbs  - acupuncture today 3rd one since bells, PT on friday  - sleep: - DIPTI not currently using CPAP (home study, BRANDI 16.1, supine 18.8, non supine 3.5, Oxygen down to 75%, while on acetazolamide/Diamox which also can treat sleep apnea and likely makes this number lower than it is) -following with sleep medicine -8/27/2024 also placed referral to ENT to consider inspire since no longer using CPAP  Still has not gone to ENT yet  6-8 hours and getting up less     Headaches and migraines   Baylee was recently diagnosed with [place] palsy 1/13/25 and she has been getting headaches everyday and nothing is helping them.  She also has numbness on the left side of her face.  - pain behind left ear non radiating across head 10/10 pounding steady pain then lasting an hour and then waiting in ER dropped to 6/10 in severity   - left ear sens to noise    Preventative:   - emgality every 28 days. -3 mo supply -PA 5/23/25  Denies bothersome side effects    - acetazolamide 125 mg every 4 hours for 4 doses - helping some since started 5 days after 1/17/25 sent  - On through other providers:  mirtazapine 45 mg (through PCP, no longer using ambien at all in year), folic acid,  Has been taking hydroxychloriquine for 1.5 years now, taking a break from Methotrexate for 2 months starting 1/7/25 (was on since June 2023 and then increased 8/8/23), duloxetine/cymbalta in am 30 mg in July 2024 and this week will increase to 60 mg (for fibromyalgia pain - first two weeks cramps and N/V SE), Vit B12 1000 mcg, estradiol/estrace vaginal cream started since last visit and not used yet, Vit D 50,000,  atorvastatin/lipitor 10 mg, metoclopramide/reglan 10 mg QID for 3 months as of 1/28/25 - prior to bells palsy was getting a tic on her right lip  No reported bothersome side effects      Abortive:   Trial of Nurtec- works well for classic migraines- happy with it - no copay - initially they only approved 8 and now they let her have 16   - rizatriptan - helps a little but not enough   - acetaminophen doesn't help  No reported bothersome side effects            Objective   There were no vitals taken for this visit.      Objective:     Exam limited by Video  Physical Exam:                                                                 Vitals:            Constitutional:    There were no vitals taken for this visit.  BP Readings from Last 3 Encounters:   01/22/25 120/88   01/15/25 122/80   11/26/24 116/68     Pulse Readings from Last 3 Encounters:   01/22/25 97   01/15/25 85   11/26/24 79         Well developed, well nourished, No dysmorphic features.         Normocephalic atraumatic.     Normal behavior and appropriate affect        Able to answer questions appropriately, provide history of recent events   Normal language and spontaneous speech.  facial expression is not symmetric, unable to raise left eyebrow, left facial droop  Symmetric bulk throughout. no atrophy, fasciculations or significant abnormal movements noted during our visit from observation.        Review of Systems:   ROS obtained by medical assistant and reviewed, but if any symptoms listed below say negative, does not mean patient has not had this symptom since last visit, please see HPI for details of symptoms discussed this visit.  Regarding any abnormal or positive findings in ROS that are not neurologic related, patient instructed to address these issues with PCP or go to the ER if they are severe.      Review of Systems   Constitutional:  Negative for appetite change and fever.   HENT: Negative.  Negative for hearing loss, tinnitus, trouble  swallowing and voice change.         Phonophobia   Eyes:  Positive for photophobia. Negative for pain.   Respiratory: Negative.  Negative for shortness of breath.    Cardiovascular: Negative.  Negative for palpitations.   Gastrointestinal:  Positive for nausea. Negative for vomiting.   Endocrine: Negative.  Negative for cold intolerance.   Genitourinary: Negative.  Negative for dysuria, frequency and urgency.   Musculoskeletal: Negative.  Negative for myalgias and neck pain.   Skin: Negative.  Negative for rash.   Neurological:  Positive for weakness left side of face and headaches (everyday). Negative for dizziness, tremors, seizures, syncope, facial asymmetry, speech difficulty, weakness and light-headedness.   Hematological: Negative.  Does not bruise/bleed easily.   Psychiatric/Behavioral: Negative.  Negative for confusion, hallucinations and sleep disturbance.    All other systems reviewed and are negative.       I have spent 23 minutes with Patient today in which greater than 50% of this time was spent in counseling/coordination of care regarding Diagnostic results, Prognosis, Risks and benefits of tx options, Instructions for management, Patient education, Importance of tx compliance, Risk factor reductions, Impressions, Counseling / Coordination of care, Documenting in the medical record, Reviewing / ordering tests, medicine, procedures  , Obtaining or reviewing history  , and Communicating with other healthcare professionals . I also spent 10 minutes non face to face for this patient the same day.           Visit Time  Total Visit Duration: 33

## 2025-01-28 NOTE — PROGRESS NOTES
Review of Systems   Constitutional:  Negative for appetite change and fever.   HENT: Negative.  Negative for hearing loss, tinnitus, trouble swallowing and voice change.         Phonophobia   Eyes:  Positive for photophobia. Negative for pain.   Respiratory: Negative.  Negative for shortness of breath.    Cardiovascular: Negative.  Negative for palpitations.   Gastrointestinal:  Positive for nausea. Negative for vomiting.   Endocrine: Negative.  Negative for cold intolerance.   Genitourinary: Negative.  Negative for dysuria, frequency and urgency.   Musculoskeletal: Negative.  Negative for myalgias and neck pain.   Skin: Negative.  Negative for rash.   Neurological:  Positive for numbness (left side of face) and headaches (everyday). Negative for dizziness, tremors, seizures, syncope, facial asymmetry, speech difficulty, weakness and light-headedness.   Hematological: Negative.  Does not bruise/bleed easily.   Psychiatric/Behavioral: Negative.  Negative for confusion, hallucinations and sleep disturbance.    All other systems reviewed and are negative.

## 2025-01-29 RX ORDER — ACETAZOLAMIDE 125 MG/1
TABLET ORAL
Qty: 180 TABLET | Refills: 6 | Status: SHIPPED | OUTPATIENT
Start: 2025-01-29

## 2025-02-05 ENCOUNTER — EVALUATION (OUTPATIENT)
Age: 60
End: 2025-02-05
Payer: MEDICARE

## 2025-02-05 DIAGNOSIS — G43.009 MIGRAINE WITHOUT AURA AND WITHOUT STATUS MIGRAINOSUS, NOT INTRACTABLE: ICD-10-CM

## 2025-02-05 DIAGNOSIS — G51.0 BELL'S PALSY: Primary | ICD-10-CM

## 2025-02-05 DIAGNOSIS — K21.9 GASTROESOPHAGEAL REFLUX DISEASE, UNSPECIFIED WHETHER ESOPHAGITIS PRESENT: ICD-10-CM

## 2025-02-05 PROCEDURE — 97161 PT EVAL LOW COMPLEX 20 MIN: CPT

## 2025-02-05 PROCEDURE — 97530 THERAPEUTIC ACTIVITIES: CPT

## 2025-02-05 RX ORDER — RABEPRAZOLE SODIUM 20 MG/1
40 TABLET, DELAYED RELEASE ORAL 2 TIMES DAILY
Qty: 360 TABLET | Refills: 1 | Status: SHIPPED | OUTPATIENT
Start: 2025-02-05

## 2025-02-05 RX ORDER — DIVALPROEX SODIUM 500 MG/1
TABLET, DELAYED RELEASE ORAL
Qty: 90 TABLET | Refills: 1 | OUTPATIENT
Start: 2025-02-05
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
## 2025-02-06 DIAGNOSIS — F51.01 PRIMARY INSOMNIA: ICD-10-CM

## 2025-02-06 RX ORDER — MIRTAZAPINE 45 MG/1
TABLET, FILM COATED ORAL
Qty: 90 TABLET | Refills: 1 | Status: SHIPPED | OUTPATIENT
Start: 2025-02-06 | End: 2025-02-13

## 2025-02-07 DIAGNOSIS — N95.2 VAGINAL ATROPHY: ICD-10-CM

## 2025-02-07 RX ORDER — ESTRADIOL 0.1 MG/G
2 CREAM VAGINAL DAILY
Qty: 42.5 G | Refills: 5 | Status: SHIPPED | OUTPATIENT
Start: 2025-02-07 | End: 2025-02-13

## 2025-02-13 ENCOUNTER — OFFICE VISIT (OUTPATIENT)
Dept: GASTROENTEROLOGY | Facility: CLINIC | Age: 60
End: 2025-02-13
Payer: MEDICARE

## 2025-02-13 VITALS
SYSTOLIC BLOOD PRESSURE: 116 MMHG | BODY MASS INDEX: 27.11 KG/M2 | TEMPERATURE: 97.6 F | OXYGEN SATURATION: 98 % | DIASTOLIC BLOOD PRESSURE: 84 MMHG | HEIGHT: 64 IN | HEART RATE: 90 BPM | WEIGHT: 158.8 LBS

## 2025-02-13 DIAGNOSIS — K58.1 IRRITABLE BOWEL SYNDROME WITH CONSTIPATION: ICD-10-CM

## 2025-02-13 DIAGNOSIS — K21.9 GASTROESOPHAGEAL REFLUX DISEASE, UNSPECIFIED WHETHER ESOPHAGITIS PRESENT: Primary | ICD-10-CM

## 2025-02-13 DIAGNOSIS — K31.84 GASTROPARESIS: ICD-10-CM

## 2025-02-13 PROCEDURE — 99213 OFFICE O/P EST LOW 20 MIN: CPT | Performed by: PHYSICIAN ASSISTANT
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
## 2025-03-05 DIAGNOSIS — G43.009 MIGRAINE WITHOUT AURA AND WITHOUT STATUS MIGRAINOSUS, NOT INTRACTABLE: ICD-10-CM

## 2025-03-05 RX ORDER — ACETAZOLAMIDE 125 MG/1
TABLET ORAL
Qty: 540 TABLET | Refills: 0 | Status: SHIPPED | OUTPATIENT
Start: 2025-03-05

## 2025-03-17 ENCOUNTER — TELEMEDICINE (OUTPATIENT)
Dept: NEUROLOGY | Facility: CLINIC | Age: 60
End: 2025-03-17
Payer: MEDICARE

## 2025-03-17 DIAGNOSIS — G47.33 OSA (OBSTRUCTIVE SLEEP APNEA): ICD-10-CM

## 2025-03-17 DIAGNOSIS — G43.009 MIGRAINE WITHOUT AURA AND WITHOUT STATUS MIGRAINOSUS, NOT INTRACTABLE: Primary | ICD-10-CM

## 2025-03-17 PROCEDURE — G2211 COMPLEX E/M VISIT ADD ON: HCPCS | Performed by: PSYCHIATRY & NEUROLOGY

## 2025-03-17 PROCEDURE — 99215 OFFICE O/P EST HI 40 MIN: CPT | Performed by: PSYCHIATRY & NEUROLOGY

## 2025-03-17 RX ORDER — ACETAZOLAMIDE 125 MG/1
TABLET ORAL
Qty: 450 TABLET | Refills: 4 | Status: SHIPPED | OUTPATIENT
Start: 2025-03-17

## 2025-03-17 RX ORDER — RIMEGEPANT SULFATE 75 MG/75MG
TABLET, ORALLY DISINTEGRATING ORAL
Qty: 16 TABLET | Refills: 11 | Status: SHIPPED | OUTPATIENT
Start: 2025-03-17

## 2025-03-17 NOTE — PATIENT INSTRUCTIONS
Please consider obtaining the labs -Lyme still pending from 1/22/25, someone ordered B12 9/12/2024 which did not get drawn but the iron panel that they did and when you get these labs we will also get a CMP just to recheck your electrolytes back on acetazolamide.     If symptoms are not improving or if worsening, can consider MRI brain although currently meets criteria for Bell's palsy which is not thought to be centrally located    Agree with PT and acupuncture    - metoclopramide/Reglan glad that you have a GI wean you off this to make sure there is no aspects of tardive dyskinesia contributing since you are having mouth movements prior to the Bell's palsy diagnosis,- glad you are holding off this now and improved in case related    Either follow-up with sleep medicine for treatment of the sleep apnea or follow-up with ENT to assess for inspire device as untreated sleep apnea does raise your risk of stroke.      Headache/migraine treatment:   Abortive medications (for immediate treatment of a headache):   It is ok to take ibuprofen, acetaminophen or naproxen (Advil, Tylenol,  Aleve, Excedrin) if they help your headaches you should limit these to No more than 3 times a week to avoid medication overuse/rebound headaches.      For your more moderate to severe migraines take this medication early   -     rimegepant sulfate (Nurtec) 75 mg TBDP; Take one NURTEC 75 mg at onset under tongue. Limit 1 in 24 hours.    - will need a prior Auth which may take 1 to 2 weeks and if you do not hear anything let us know and there is a coupon card on the website for the co-pay.  If your insurance prefers Ubrelvy it is a similar medication I will send that in instead.    Rizatriptan 10 mg tabs - take one at the onset of headache. May repeat one time after 1-2 hours if pain has not resolved.   (Max 2 a day and 9 a month)      To try and break a migraine if needed in the future since didn't take yet:  -     divalproex sodium  (Depakote) 500 mg DR tablet; 500 mg p.o. nightly for 1-15 nights for unrelenting migraine    Prescription preventive medications for headaches/migraines   (to take every day to help prevent headaches - not to take at the time of headache):  [x]Emgality/Galcanezumab  120 mg injections every 28 days     Keep out of direct sunlight. Prior to administration, allow to come to room temperature for 30 minutes. Do not warm using a heat source (eg, microwave or hot water). Do not shake. Administer in abdomen (avoiding 2 inches around the navel), thigh, upper arm, or buttocks avoiding areas of skin that are tender, bruised, red or hard. Deliver entire contents of single-use prefilled pen or syringe.       Continue   -     acetaZOLAMIDE (DIAMOX) 125 mg tablet; TAKE 1 TABLET BY MOUTH UPON AWAKENING AND EVERY 4 HOURS DURING THE DAY FOR 5 DOSES       - if a lower dose is helpful, can stay lower, if higher dose causes side effects, go back to last tolerated dose.  If you get all the way up to the dose recommended and is not helping enough let me know as I will absolutely go higher.    - make sure to stay hydrated while on this as can cause dehydration since that is it's purpose to take fluid off.   - most common side effect is tingling of the nerves at times, especially if you had neuropathy in the past it may cause tingling in these nerves, this is common and typically should get better over time.  It also can make carbonated beverages taste flat or given metallic taste to things.  -It can cause decreased appetite and weight loss in some individuals  - can cause electrolyte disturbances, but not typically in any significant way. However, be cautious if taking with other meds that lower potassium like hydrochlorothiazide etc    It is important to try and eat potassium rich foods while taking this  Potassium rich foods include:  - bananas, yogurt, sweet potatoes, tomato sauces, greens,  kidney and lima beans, lentils, split peas,  soybeans, prunes, carrot or Orange juice, fish, milk        *Typically these types of medications take time untill you see the benefit, although some may see improvement in days, often it may take weeks, especially if the medication is being titrated up to a beneficial level. Please contact us if there are any concerns or questions regarding the medication.         Lifestyle Recommendations:  [x] SLEEP - Maintain a regular sleep schedule: Adults need at least 7-8 hours of uninterrupted a night. Maintain good sleep hygiene:  Going to bed and waking up at consistent times, avoiding excessive daytime naps, avoiding caffeinated beverages in the evening, avoid excessive stimulation in the evening and generally using bed primarily for sleeping.  One hour before bedtime would recommend turning lights down lower, decreasing your activity (may read quietly, listen to music at a low volume). When you get into bed, should eliminate all technology (no texting, emailing, playing with your phone, iPad or tablet in bed).  [x] HYDRATION - Maintain good hydration.  Drink  2L of fluid a day (4 typical small water bottles)  [x] DIET - Maintain good nutrition. In particular don't skip meals and try and eat healthy balanced meals regularly.  [x] TRIGGERS - Look for other triggers and avoid them: Limit caffeine to 1-2 cups a day or less. Avoid dietary triggers that you have noticed bring on your headaches (this could include aged cheese, peanuts, MSG, aspartame and nitrates).  [x] EXERCISE - physical exercise as we all know is good for you in many ways, and not only is good for your heart, but also is beneficial for your mental health, cognitive health and  chronic pain/headaches. I would encourage at the least 5 days of physical exercise weekly for at least 30 minutes.      Education and Follow-up  [x] Please call with any questions or concerns. Of course if any new concerning symptoms go to the emergency department.  [x] Follow up 2-3  months, sooner if needed

## 2025-03-17 NOTE — PROGRESS NOTES
Virtual Regular VisitName: Baylee Villafuerte      : 1965      MRN: [id number]  Encounter Provider: Natali Garcia MD  Encounter Date: 3/17/2025   Encounter department: [facility]  :  Assessment & Plan  Migraine without aura and without status migrainosus, not intractable    Assessment/Plan:   Baylee Reza is a very pleasant 59 y.o. female with a past medical history that includes migraines, anxiety, depression, arthralgia, HTN, GERD,  IBS, hypercholesteremia, iron deficiency anemia, kidney stones, osteopenia, insomnia, bells palsy referred here for evaluation of headache.  My initial evaluation 2020     Migraine without aura without status migrainosus, not intractable  Features of idiopathic intracranial hypertension on imaging not meeting criteria for IIH without papilledema  DIPTI not currently using CPAP (home study, BRANDI 16.1, supine 18.8, non supine 3.5, Oxygen down to 75%, while on acetazolamide/Diamox which also can treat sleep apnea and likely makes this number lower than it is) -following with sleep medicine -2024 also placed referral to ENT to consider inspire since no longer using CPAP  She reports a long history of headaches and migraines that have varied in frequency and severity throughout life.  Pain is typically left frontal/retro-orbital and sometimes in the back.  She denies aura and reports typical associated migrainous features.  - as of 2020: chronic daily headaches and Migraines 3-4 days per week for the past year or so  - as of 2021:   Migraines have drastically improved to only 2-3 per month.  She did not start any new preventative and since she is so improved will hold off on adding anything at this time.  Continue sumatriptan 100 mg as needed for abortive.  - as of 2021: She reports Migraines a little worse to about 4 migraines a month, willing to add preventative as sumatriptan does not always work well, trial of emgality.    - as of 1/27/2022: Emgality helped a little initially after first loading dose and then pharmacy said she did not have refills and she did not call us so has not had since Sept. Now under a lot of stress, getting about 4 migraines a month, daily mild migraines/headaches. Resume emgality one shot monthly. Continue sumatriptan for abortive.   - as of 7/28/2022: She reports significant improvement on emgality with decreased frequency and severity of migraines to mild 4 a month, and 2 severe a month. Did not realize she could still take sumatriptan so was just taking OTC meds, refilled sumatriptan/imitrex. toradol IM today for migraine since yesterday.  - as of 1/20/2023: She was doing great on Emgality, but last dose was in September or October due to mom passing away and had to leave town to help. Will resume emgality monthly as migraines are again 2-3 days a week, trial of rizatriptan in place of sumatriptan.   - as of 6/21/2023: She reports improvement on emgality, but not as significant improvement as would be expected and she believes due to the lapse in getting it and we discussed I believe it may be due to DIPTI causing subclinical IIH picture and highly recommend trial of acetazolamide/Diamox.  I believe subclinical IIH actually may be contributing to more symptoms than realized due to tight cerebellar anatomy.  Sleep study to evaluate for sleep apnea although home study can miss it, will likely need in lab.  We discussed this may be why she needs to sleep aids, blood pressure pill and it may be contributing to her vision issues that she has been told are cataracts only.  We discussed sleep apnea can cause significant morbidity/mortality issues if untreated.  Trial of rizatriptan works better than sumatriptan although she is not taking it lately due to starting methotrexate and was told not to take anything on it.  Review of recent CT shows multiple signs of increased intracranial pressure.   - as of 9/21/23:  She reports improvement since last visit with on average 2 headaches a week due to emgality decreasing her migraines and acetazolamide 125 mg TID decreasing suspected subtle increased intracranial pressure contributing, following closely with eye doctors and upcoming cataract surgery and no history of papilledema to call this IIH per standard criteria. PCP stopped her chlorthalidone due to BP normalizing. She reports being able to sleep longer and nap more, and I again encouraged scheduling sleep study, interestingly diamox also treats some forms of sleep apnea. Will increase diamox as tolerated to 125 mg 4 times a day while a wake to try and prevent wearing off and add night time dosing if needed when it would be wearing off the longest.  - as of 11/14/2023: She reports migraines remain improved on Emgality with on average 3 a week and rizatriptan typically helps without side effects.  She reports acetazolamide/Diamox 125 mg every 4 hours is helping although likely not enough especially since she is hardly sleeping at all and we discussed switching to 500 mg twice daily to see if this can help more with less wearing off effect and rebound high blood pressure and can help last throughout the night.  She indeed was diagnosed with sleep apnea at a moderate level and we discussed I highly recommend CPAP and nothing less for treatment despite being positional and she will be following up with sleep medicine in January.  In the meantime if needed can sleep on side or propped up in chair.  Following with eye doctor with persistent vision issues and he consistently does not see papilledema or signs that this would be related although we discussed I think it may be potentially impacting cataracts and she does have upcoming cataract surgery.   - as of 2/22/2024: She has had a difficult time tolerating CPAP thus far and we discussed some of the symptoms she thinks are related to the CPAP side effects sound potentially related  to acetazolamide wearing off at 8-hour lucia rather than 12 and could consider increasing to 500 mg long-acting every 8 hours however she reports bothersome side effects and therefore will instead transition to previous 125 mg every 4 hours for 1 to 2 weeks and then can take as needed if needed.  I suspect her headaches could get worse off of this medication, but treating the sleep apnea would help with this, and she received a new mask today that she is going to try.  Waking up with headaches also likely related to wearing off effect, otherwise 2-3 a week.  Emgality has decreased migraine days and will continue.  Rizatriptan worked more in the past and will add trial of Nurtec to see if this can help more and also help with prevention the more she takes it as well as with wearing off of emgality.   - as of 2024: She reports 1-2 migraines a week and rizatriptan helps sometimes and not others and she never received the Nurtec trial.  This is off of Emgality the last 2 months with last dose in March.  It appears the prior authorization  in January and we were not aware (but got through March due to 3 month supply), recommended she let us know in the future and will have the nursing team work on the prior Auth now.  Will also have the nursing team look into why the prior authorization was never sent to us for Nurtec can help with this as the more she takes Nurtec it also can help with prevention. She followed up with sleep medicine, they changed her from nasal pillow to fullface, decrease the pressure and is now using her CPAP more regularly, although may not use it some nights on average 7 and half hours with residual AHI 2.2.   - as of 2024: She reports headaches and migraines have improved since last visit and now on average once a week that improves with Nurtec.  She is not currently treating her sleep apnea and we discussed the potential morbidity/mortality of untreated sleep apnea especially at  the moderate level with oxygen dropping to 75% and likely underestimated while on acetazolamide/Diamox at the time of the study.  I would recommend she make a follow-up with sleep medicine to discuss sleep apnea being untreated and other options.  She is open to referral to Dr. Munoz to discuss inspire as an alternative as she already wanted to follow-up with ENT as well.  Continue Emgality for migraine prevention.  Since last visit others added duloxetine/Cymbalta for fibromyalgia and she will be increasing it from 30 mg to 60 mg soon.   - as of 1/28/2025: She reports headaches have been exacerbated by recent diagnosis of Bell's palsy 1/13/2025 with complete paralysis of the left side of the face including forehead.  She was evaluated in ER where noncontrast head CT was read as no acute pathology, given Valtrex and prednisone and PCP finish the prednisone course, she unfortunately then had oral thrush and is establishing care with physical therapy in 2 days for their assistance with the Bell's palsy as the left facial weakness remains significant.  This has been associated with a severe persistent headache with migrainous features and since she had tolerated and found acetazolamide helpful in the past we added this back 1/22/2025 (took 5 days to get) and I recommend continuing at least while headache is this bad due to the untreated sleep apnea which I still would recommend following up with ENT for consideration of inspire device.  We discussed we have multiple other medications to try and break this cycle and she would like to proceed with trial of Depakote 500 mg at night and will refill dexamethasone for if severe although she is aware of the risk/side effects.  Continue to follow with PCP for overall health care otherwise.  Of note she reports they held her methotrexate 1/7/2025 for 2-month trial, duloxetine/Cymbalta one of the newer meds started this summer, metoclopramide/reglan 10 mg QID also started  around November or December 2024 for gastroparesis, reports she was starting to get a tic of her right lip prior to the Bell's palsy.  We discussed could consider MRI brain if there are new or concerning features at any time.  Nurtec typically works for her migraines, not this unrelenting 1 and rizatriptan did not help either.  ER if any new or concerning symptoms.  - as of 3/17/2025: On Emgality every 28 days and acetazolamide 125 mg every 4 hours for a total of 500 mg daily which is generally allowed dose she has had about 1 migraine a week that typically resolves with Nurtec.  Headaches on average for a week and she finds these are little worst related to recent diagnosis of Bell's palsy which has significantly improved since last visit.  She took the dexamethasone after last visit and felt it helped and she did not trial the Depakote as she was afraid of too many medications and therefore has available at home if ever needed in the future. She has had significant/(I would say tremendous compared to some people) improvement in her Bell's palsy symptoms, possibly due to time passing, possibly due to stopping metoclopramide/Reglan 10 mg 4 times daily that have been started in 2024 for gastroparesis as she was developing a tic of her lip prior to the Bell's coming on, possibly related to resuming acetazolamide/Diamox, poss related to acupuncture and concretely improved with following with eye doctor weekly for gel and eyedrops for the eye that was open for so long and now is able to close, smile used to be pulled to the right now more in the middle, still some decreased left eyebrow raise and left side of the face feels just a little stiffer but overall majorly better.  She continues to have untreated sleep apnea and hypoxia at night and is aware I had a recommend resuming CPAP or considering inspire and she is considering CPAP rather than going under anesthesia at this time and we discussed anything would be better  than nothing and I absolutely think it is contributing to many symptoms and she is aware of the morbidity/mortality.  CMP for monitoring of electrolytes added to the Lyme test that she has not obtained yet through other providers.    Workup:  -Noncontrast head CT 4/10/2023:  No acute intracranial abnormality. *we discussed as of my retrospective review 9/21/23, the nonspecific signs that I also see on MRI brain 2019 as below  - CTA head and neck with without contrast 07/23/2020:  1. No evidence of acute intracranial hemorrhage.  2. No evidence of hemodynamic significant stenosis, aneurysm or dissection.  - MRI brain with without contrast 08/01/2019:  A few nonspecific white matter lesions.  No acute infarction, intracranial hemorrhage or mass.*As of my retrospective review 2/22/2024 she has a partially empty sella that is nearly empty, bilateral optic nerve sheath prominence and tortuosity, tighter ventricles than expected for age 58, cerebellar tonsils overlie the foramen magnum with tight junction right greater than left without Chiari  - If she is open to it, we can obtain follow up MRI Brain to further evaluate at anytime     Preventative:  - we discussed headache hygiene and lifestyle factors that may improve headaches  -  continue Galcanezumab-gnlm 120 MG/ML SOAJ; Inject 120 mg under the skin every 28 days (3 month supply so she doesn't have to wait for the prescription delayed every month). Discussed proper use, possible side effects and risks.  -     acetaZOLAMIDE (DIAMOX) 125 mg tablet; TAKE 1 TABLET BY MOUTH UPON AWAKENING AND EVERY 4 HOURS DURING THE DAY FOR 4-5 DOSES. Discussed proper use, off label use with certain meds, possible side effects and risks.  - On through other providers:  stopped around July 2024 -  folic acid,  hydroxychloriquine for years now,  ), duloxetine/cymbalta in am 30 mg in July 2024 and 8/27/2024 increased to 60 mg (for fibromyalgia pain - first two weeks cramps and N/V SE), Vit  B12 1000 mcg,Vit D 50,000, atorvastatin/lipitor 10 mg,   - past/failed:  Amitriptyline, mirtazapine, topiramate,  Ambien, aimovig contraindicated due to constiptation and latex allergy, Chlorthalidone, acetazolamide SE dry mouth (on top of baseline dry mouth that she has just as bad off of it due to Sjogren's) and tingling bothersome and wore off at 8 hours but couldn't feel she could increase to TID due to SE, metoclopramide/Reglan 10 mg  QID weaned off after visit Jan 2025 and just got off mid March 2024 due to possible TD to was on 3 months as of 1/28/25 - prior to bells palsy was getting a tic on her lip, Rabeprazole/Aciphex 40 mg twice daily for GERD   - future options:   alternative CGRP med, botox     Abortive:  - discussed not taking over-the-counter or prescription pain medications more than 3 days per week to prevent medication overuse/rebound headache  - through other providers:  Ondansetron helps nausea  -   rimegepant sulfate (Nurtec) 75 mg TBDP; Take one NURTEC 75 mg at onset under tongue. Limit 1 in 24 hours. Discussed proper use, possible side effects and risks.  -Rizatriptan 10 mg helps the most severe migraines, takes rarely due to SE. Discussed proper use, possible side effects and risks.  - past/failed:  Sumatriptan 50 mg 100 mg, rizatriptan   - past/helped: toradol IM helped, steroids tolerated - prefers not to have it due to stomach irritation   - future options:  prochlorperazine, metoclopramide, Toradol IM or p.o., could consider trial for 5 days of Depakote or dexamethasone 4 prolonged migraine, ubrelvy, reyvow     Patient instructions      *Please consider following up with PCP for low mood screen today which showed signs of depression and I would be happy to place referral for talk therapy or medication management a time if you are interested.    Please consider obtaining the labs -Lyme still pending from 1/22/25, someone ordered B12 9/12/2024 which did not get drawn but the iron panel  that they did and when you get these labs we will also get a CMP just to recheck your electrolytes back on acetazolamide.     If symptoms are not improving or if worsening, can consider MRI brain although currently meets criteria for Bell's palsy which is not thought to be centrally located    Agree with PT and acupuncture    - metoclopramide/Reglan glad that you have a GI wean you off this to make sure there is no aspects of tardive dyskinesia contributing since you are having mouth movements prior to the Bell's palsy diagnosis,- glad you are holding off this now and improved in case related    Either follow-up with sleep medicine for treatment of the sleep apnea or follow-up with ENT to assess for inspire device as untreated sleep apnea does raise your risk of stroke.      Headache/migraine treatment:   Abortive medications (for immediate treatment of a headache):   It is ok to take ibuprofen, acetaminophen or naproxen (Advil, Tylenol,  Aleve, Excedrin) if they help your headaches you should limit these to No more than 3 times a week to avoid medication overuse/rebound headaches.      For your more moderate to severe migraines take this medication early   -     rimegepant sulfate (Nurtec) 75 mg TBDP; Take one NURTEC 75 mg at onset under tongue. Limit 1 in 24 hours.    - will need a prior Auth which may take 1 to 2 weeks and if you do not hear anything let us know and there is a coupon card on the website for the co-pay.  If your insurance prefers Ubrelvy it is a similar medication I will send that in instead.    Rizatriptan 10 mg tabs - take one at the onset of headache. May repeat one time after 1-2 hours if pain has not resolved.   (Max 2 a day and 9 a month)      To try and break a migraine if needed in the future since didn't take yet:  -     divalproex sodium (Depakote) 500 mg DR tablet; 500 mg p.o. nightly for 1-15 nights for unrelenting migraine    Prescription preventive medications for headaches/migraines    (to take every day to help prevent headaches - not to take at the time of headache):  [x]Emgality/Galcanezumab  120 mg injections every 28 days     Keep out of direct sunlight. Prior to administration, allow to come to room temperature for 30 minutes. Do not warm using a heat source (eg, microwave or hot water). Do not shake. Administer in abdomen (avoiding 2 inches around the navel), thigh, upper arm, or buttocks avoiding areas of skin that are tender, bruised, red or hard. Deliver entire contents of single-use prefilled pen or syringe.       Continue   -     acetaZOLAMIDE (DIAMOX) 125 mg tablet; TAKE 1 TABLET BY MOUTH UPON AWAKENING AND EVERY 4 HOURS DURING THE DAY FOR 5 DOSES       - if a lower dose is helpful, can stay lower, if higher dose causes side effects, go back to last tolerated dose.  If you get all the way up to the dose recommended and is not helping enough let me know as I will absolutely go higher.    - make sure to stay hydrated while on this as can cause dehydration since that is it's purpose to take fluid off.   - most common side effect is tingling of the nerves at times, especially if you had neuropathy in the past it may cause tingling in these nerves, this is common and typically should get better over time.  It also can make carbonated beverages taste flat or given metallic taste to things.  -It can cause decreased appetite and weight loss in some individuals  - can cause electrolyte disturbances, but not typically in any significant way. However, be cautious if taking with other meds that lower potassium like hydrochlorothiazide etc    It is important to try and eat potassium rich foods while taking this  Potassium rich foods include:  - bananas, yogurt, sweet potatoes, tomato sauces, greens,  kidney and lima beans, lentils, split peas, soybeans, prunes, carrot or Orange juice, fish, milk        *Typically these types of medications take time untill you see the benefit, although some may  see improvement in days, often it may take weeks, especially if the medication is being titrated up to a beneficial level. Please contact us if there are any concerns or questions regarding the medication.         Lifestyle Recommendations:  [x] SLEEP - Maintain a regular sleep schedule: Adults need at least 7-8 hours of uninterrupted a night. Maintain good sleep hygiene:  Going to bed and waking up at consistent times, avoiding excessive daytime naps, avoiding caffeinated beverages in the evening, avoid excessive stimulation in the evening and generally using bed primarily for sleeping.  One hour before bedtime would recommend turning lights down lower, decreasing your activity (may read quietly, listen to music at a low volume). When you get into bed, should eliminate all technology (no texting, emailing, playing with your phone, iPad or tablet in bed).  [x] HYDRATION - Maintain good hydration.  Drink  2L of fluid a day (4 typical small water bottles)  [x] DIET - Maintain good nutrition. In particular don't skip meals and try and eat healthy balanced meals regularly.  [x] TRIGGERS - Look for other triggers and avoid them: Limit caffeine to 1-2 cups a day or less. Avoid dietary triggers that you have noticed bring on your headaches (this could include aged cheese, peanuts, MSG, aspartame and nitrates).  [x] EXERCISE - physical exercise as we all know is good for you in many ways, and not only is good for your heart, but also is beneficial for your mental health, cognitive health and  chronic pain/headaches. I would encourage at the least 5 days of physical exercise weekly for at least 30 minutes.      Education and Follow-up  [x] Please call with any questions or concerns. Of course if any new concerning symptoms go to the emergency department.  [x] Follow up 2-3 months, sooner if needed   Orders:    Galcanezumab-gnlm 120 MG/ML SOAJ; Inject 120 mg under the skin every 28 days (3 month supply so she doesn't have to  wait for the prescription delayed every month)    rimegepant sulfate (Nurtec) 75 mg TBDP; Take one NURTEC 75 mg at onset on (or under) tongue daily as needed for migraine    Comprehensive metabolic panel; Future    acetaZOLAMIDE (DIAMOX) 125 mg tablet; TAKE 1 TABLET BY MOUTH UPON AWAKENING AND EVERY 4 HOURS DURING THE DAY FOR 5 DOSES    DIPTI (obstructive sleep apnea)               History of Present Illness     Interval history as of 3/17/2025  - Of note/managed by others:   Since last visit follow-up with PT, GI  - Positive depression screening -recommend to continue to follow-up with PCP, referral to psych services including talk therapy or psychiatry is available if she becomes interested, also asked nursing team today to follow this up to make sure she does not need any further assistance  - re Trav - improvement since last visit in mouth pulled towards the right now more in the middle, eye was drying out and therefore seeing eye doctor weekly - gel, drops every 2 hours   - acupuncture on the face - 85 a visit  - no significant new or concerning neurologic symptoms since last visit reported    - diagnostics of note (please see EMR for others/details):   - recent labs reviewed  - last eye exam: eye was drying out and therefore seeing eye doctor weekly - gel, drops every 2 hours   No damage to cornea   No papilledema     - sleep: DIPTI not currently using CPAP (home study, BRANDI 16.1, supine 18.8, non supine 3.5, Oxygen down to 75%, while on acetazolamide/Diamox which also can treat sleep apnea and likely makes this number lower than it is) -following with sleep medicine -8/27/2024 also placed referral to ENT to consider inspire since no longer using CPAP  - sleeping more now - sleep around 11 and sleeps to 10 AM or 1 PM   Hoping to go back to the machine but would rather than going under     Headaches and migraines   Headaches 4 times a week   - she feels has been a little more frequent due to her Bell's Palsy  recently  Migraine -once a week.   Still stiffer on the left, slight decreased left eye brow    Preventative:    - emgality every 28 days  - acetazolamide 125 mg every 4 hours - 4 doses   - On through other providers:  stopped around July 2024 -  folic acid,  hydroxychloriquine for years now,  ), duloxetine/cymbalta in am 30 mg in July 2024 and 8/27/2024 increased to 60 mg (for fibromyalgia pain - first two weeks cramps and N/V SE), Vit B12 1000 mcg,Vit D 50,000, atorvastatin/lipitor 10 mg,   Was weaned off metoclopramide/Reglan 10 mg QID visit    Abortive:     Nurtec   trial of Depakote 500 mg at night and will refill dexamethasone for if severe although  -the steroid helped and didn't take the depakote as afraid of too many meds   - sometimes takes steroids for RA as needed - for a week only in Jan   - rizatriptan rarely for back up needed   No reported bothersome side effects            Objective   There were no vitals taken for this visit.      Objective:     Exam limited by Video  Physical Exam:                                                                 Vitals:            Constitutional:    There were no vitals taken for this visit.  BP Readings from Last 3 Encounters:   02/13/25 116/84   01/22/25 120/88   01/15/25 122/80     Pulse Readings from Last 3 Encounters:   02/13/25 90   01/22/25 97   01/15/25 85         Well developed, well nourished, No dysmorphic features.         Normocephalic atraumatic.     Normal behavior and appropriate affect        Able to answer questions appropriately, provide history of recent events   Normal language and spontaneous speech.  facial expression with slight decreased left eyebrow raise, slight decreased smile on the left difficult to fully appreciate over video  Symmetric bulk throughout. no atrophy, fasciculations or significant abnormal movements noted during our visit from observation.        Review of Systems:   ROS obtained by medical assistant and reviewed, but if  any symptoms listed below say negative, does not mean patient has not had this symptom since last visit, please see HPI for details of symptoms discussed this visit.  Regarding any abnormal or positive findings in ROS that are not neurologic related, patient instructed to address these issues with PCP or go to the ER if they are severe.         Review of Systems   Constitutional:  Negative for appetite change, fatigue and fever.   HENT: Negative.  Negative for hearing loss, tinnitus, trouble swallowing and voice change.    Eyes: Negative.  Negative for photophobia, pain and visual disturbance.   Respiratory: Negative.  Negative for shortness of breath.    Cardiovascular: Negative.  Negative for palpitations.   Gastrointestinal: Negative.  Negative for nausea and vomiting.   Endocrine: Negative.  Negative for cold intolerance.   Genitourinary: Negative.  Negative for dysuria, frequency and urgency.   Musculoskeletal:  Negative for back pain, gait problem, myalgias, neck pain and neck stiffness.   Skin: Negative.  Negative for rash.   Allergic/Immunologic: Negative.    Neurological:  Positive for headaches. Negative for dizziness, tremors, seizures, syncope, facial asymmetry, speech difficulty, weakness, light-headedness and numbness.   Hematological: Negative.  Does not bruise/bleed easily.   Psychiatric/Behavioral: Negative.  Negative for confusion, hallucinations and sleep disturbance.            Administrative Statements   Encounter provider Natali Garcia MD    The Patient is located at Home and in the following state in which I hold an active license [place].    The patient was identified by name and date of birth. Baylee Claroszquez Villafuerte was informed that this is a telemedicine visit and that the visit is being conducted through the Epic Embedded platform. She agrees to proceed..  My office door was closed. No one else was in the room.  She acknowledged consent and understanding of privacy and security of the  video platform. The patient has agreed to participate and understands they can discontinue the visit at any time.    I have spent a total time of 53 minutes in caring for this patient on the day of the visit/encounter including Prognosis, Risks and benefits of tx options, Instructions for management, Patient education, Importance of tx compliance, Risk factor reductions, Impressions, Counseling / Coordination of care, Documenting in the medical record, Reviewing/placing orders in the medical record (including tests, medications, and/or procedures), Obtaining or reviewing history  , and Communicating with other healthcare professionals , not including the time spent for establishing the audio/video connection.

## 2025-03-17 NOTE — ASSESSMENT & PLAN NOTE
Assessment/Plan:   Baylee Reza is a very pleasant 59 y.o. female with a past medical history that includes migraines, anxiety, depression, arthralgia, HTN, GERD,  IBS, hypercholesteremia, iron deficiency anemia, kidney stones, osteopenia, insomnia, bells palsy referred here for evaluation of headache.  My initial evaluation 8/19/2020     Migraine without aura without status migrainosus, not intractable  Features of idiopathic intracranial hypertension on imaging not meeting criteria for IIH without papilledema  DIPTI not currently using CPAP (home study, BRANDI 16.1, supine 18.8, non supine 3.5, Oxygen down to 75%, while on acetazolamide/Diamox which also can treat sleep apnea and likely makes this number lower than it is) -following with sleep medicine -8/27/2024 also placed referral to ENT to consider inspire since no longer using CPAP  She reports a long history of headaches and migraines that have varied in frequency and severity throughout life.  Pain is typically left frontal/retro-orbital and sometimes in the back.  She denies aura and reports typical associated migrainous features.  - as of 8/19/2020: chronic daily headaches and Migraines 3-4 days per week for the past year or so  - as of 1/29/2021:   Migraines have drastically improved to only 2-3 per month.  She did not start any new preventative and since she is so improved will hold off on adding anything at this time.  Continue sumatriptan 100 mg as needed for abortive.  - as of 8/27/2021: She reports Migraines a little worse to about 4 migraines a month, willing to add preventative as sumatriptan does not always work well, trial of emgality.   - as of 1/27/2022: Emgality helped a little initially after first loading dose and then pharmacy said she did not have refills and she did not call us so has not had since Sept. Now under a lot of stress, getting about 4 migraines a month, daily mild migraines/headaches. Resume emgality one shot monthly.  Continue sumatriptan for abortive.   - as of 7/28/2022: She reports significant improvement on emgality with decreased frequency and severity of migraines to mild 4 a month, and 2 severe a month. Did not realize she could still take sumatriptan so was just taking OTC meds, refilled sumatriptan/imitrex. toradol IM today for migraine since yesterday.  - as of 1/20/2023: She was doing great on Emgality, but last dose was in September or October due to mom passing away and had to leave town to help. Will resume emgality monthly as migraines are again 2-3 days a week, trial of rizatriptan in place of sumatriptan.   - as of 6/21/2023: She reports improvement on emgality, but not as significant improvement as would be expected and she believes due to the lapse in getting it and we discussed I believe it may be due to DIPTI causing subclinical IIH picture and highly recommend trial of acetazolamide/Diamox.  I believe subclinical IIH actually may be contributing to more symptoms than realized due to tight cerebellar anatomy.  Sleep study to evaluate for sleep apnea although home study can miss it, will likely need in lab.  We discussed this may be why she needs to sleep aids, blood pressure pill and it may be contributing to her vision issues that she has been told are cataracts only.  We discussed sleep apnea can cause significant morbidity/mortality issues if untreated.  Trial of rizatriptan works better than sumatriptan although she is not taking it lately due to starting methotrexate and was told not to take anything on it.  Review of recent CT shows multiple signs of increased intracranial pressure.   - as of 9/21/23: She reports improvement since last visit with on average 2 headaches a week due to emgality decreasing her migraines and acetazolamide 125 mg TID decreasing suspected subtle increased intracranial pressure contributing, following closely with eye doctors and upcoming cataract surgery and no history of  papilledema to call this IIH per standard criteria. PCP stopped her chlorthalidone due to BP normalizing. She reports being able to sleep longer and nap more, and I again encouraged scheduling sleep study, interestingly diamox also treats some forms of sleep apnea. Will increase diamox as tolerated to 125 mg 4 times a day while a wake to try and prevent wearing off and add night time dosing if needed when it would be wearing off the longest.  - as of 11/14/2023: She reports migraines remain improved on Emgality with on average 3 a week and rizatriptan typically helps without side effects.  She reports acetazolamide/Diamox 125 mg every 4 hours is helping although likely not enough especially since she is hardly sleeping at all and we discussed switching to 500 mg twice daily to see if this can help more with less wearing off effect and rebound high blood pressure and can help last throughout the night.  She indeed was diagnosed with sleep apnea at a moderate level and we discussed I highly recommend CPAP and nothing less for treatment despite being positional and she will be following up with sleep medicine in January.  In the meantime if needed can sleep on side or propped up in chair.  Following with eye doctor with persistent vision issues and he consistently does not see papilledema or signs that this would be related although we discussed I think it may be potentially impacting cataracts and she does have upcoming cataract surgery.   - as of 2/22/2024: She has had a difficult time tolerating CPAP thus far and we discussed some of the symptoms she thinks are related to the CPAP side effects sound potentially related to acetazolamide wearing off at 8-hour lucia rather than 12 and could consider increasing to 500 mg long-acting every 8 hours however she reports bothersome side effects and therefore will instead transition to previous 125 mg every 4 hours for 1 to 2 weeks and then can take as needed if needed.  I  suspect her headaches could get worse off of this medication, but treating the sleep apnea would help with this, and she received a new mask today that she is going to try.  Waking up with headaches also likely related to wearing off effect, otherwise 2-3 a week.  Emgality has decreased migraine days and will continue.  Rizatriptan worked more in the past and will add trial of Nurtec to see if this can help more and also help with prevention the more she takes it as well as with wearing off of emgality.   - as of 2024: She reports 1-2 migraines a week and rizatriptan helps sometimes and not others and she never received the Nurtec trial.  This is off of Emgality the last 2 months with last dose in March.  It appears the prior authorization  in January and we were not aware (but got through March due to 3 month supply), recommended she let us know in the future and will have the nursing team work on the prior Auth now.  Will also have the nursing team look into why the prior authorization was never sent to us for Nurtec can help with this as the more she takes Nurtec it also can help with prevention. She followed up with sleep medicine, they changed her from nasal pillow to fullface, decrease the pressure and is now using her CPAP more regularly, although may not use it some nights on average 7 and half hours with residual AHI 2.2.   - as of 2024: She reports headaches and migraines have improved since last visit and now on average once a week that improves with Nurtec.  She is not currently treating her sleep apnea and we discussed the potential morbidity/mortality of untreated sleep apnea especially at the moderate level with oxygen dropping to 75% and likely underestimated while on acetazolamide/Diamox at the time of the study.  I would recommend she make a follow-up with sleep medicine to discuss sleep apnea being untreated and other options.  She is open to referral to Dr. Munoz to discuss  inspire as an alternative as she already wanted to follow-up with ENT as well.  Continue Emgality for migraine prevention.  Since last visit others added duloxetine/Cymbalta for fibromyalgia and she will be increasing it from 30 mg to 60 mg soon.   - as of 1/28/2025: She reports headaches have been exacerbated by recent diagnosis of Bell's palsy 1/13/2025 with complete paralysis of the left side of the face including forehead.  She was evaluated in ER where noncontrast head CT was read as no acute pathology, given Valtrex and prednisone and PCP finish the prednisone course, she unfortunately then had oral thrush and is establishing care with physical therapy in 2 days for their assistance with the Bell's palsy as the left facial weakness remains significant.  This has been associated with a severe persistent headache with migrainous features and since she had tolerated and found acetazolamide helpful in the past we added this back 1/22/2025 (took 5 days to get) and I recommend continuing at least while headache is this bad due to the untreated sleep apnea which I still would recommend following up with ENT for consideration of inspire device.  We discussed we have multiple other medications to try and break this cycle and she would like to proceed with trial of Depakote 500 mg at night and will refill dexamethasone for if severe although she is aware of the risk/side effects.  Continue to follow with PCP for overall health care otherwise.  Of note she reports they held her methotrexate 1/7/2025 for 2-month trial, duloxetine/Cymbalta one of the newer meds started this summer, metoclopramide/reglan 10 mg QID also started around November or December 2024 for gastroparesis, reports she was starting to get a tic of her right lip prior to the Bell's palsy.  We discussed could consider MRI brain if there are new or concerning features at any time.  Nurtec typically works for her migraines, not this unrelenting 1 and  rizatriptan did not help either.  ER if any new or concerning symptoms.  - as of 3/17/2025: On Emgality every 28 days and acetazolamide 125 mg every 4 hours for a total of 500 mg daily which is generally allowed dose she has had about 1 migraine a week that typically resolves with Nurtec.  Headaches on average for a week and she finds these are little worst related to recent diagnosis of Bell's palsy which has significantly improved since last visit.  She took the dexamethasone after last visit and felt it helped and she did not trial the Depakote as she was afraid of too many medications and therefore has available at home if ever needed in the future. She has had significant/(I would say tremendous compared to some people) improvement in her Bell's palsy symptoms, possibly due to time passing, possibly due to stopping metoclopramide/Reglan 10 mg 4 times daily that have been started in 2024 for gastroparesis as she was developing a tic of her lip prior to the Bell's coming on, possibly related to resuming acetazolamide/Diamox, poss related to acupuncture and concretely improved with following with eye doctor weekly for gel and eyedrops for the eye that was open for so long and now is able to close, smile used to be pulled to the right now more in the middle, still some decreased left eyebrow raise and left side of the face feels just a little stiffer but overall majorly better.  She continues to have untreated sleep apnea and hypoxia at night and is aware I had a recommend resuming CPAP or considering inspire and she is considering CPAP rather than going under anesthesia at this time and we discussed anything would be better than nothing and I absolutely think it is contributing to many symptoms and she is aware of the morbidity/mortality.  CMP for monitoring of electrolytes added to the Lyme test that she has not obtained yet through other providers.    Workup:  -Noncontrast head CT 4/10/2023:  No acute  intracranial abnormality. *we discussed as of my retrospective review 9/21/23, the nonspecific signs that I also see on MRI brain 2019 as below  - CTA head and neck with without contrast 07/23/2020:  1. No evidence of acute intracranial hemorrhage.  2. No evidence of hemodynamic significant stenosis, aneurysm or dissection.  - MRI brain with without contrast 08/01/2019:  A few nonspecific white matter lesions.  No acute infarction, intracranial hemorrhage or mass.*As of my retrospective review 2/22/2024 she has a partially empty sella that is nearly empty, bilateral optic nerve sheath prominence and tortuosity, tighter ventricles than expected for age 58, cerebellar tonsils overlie the foramen magnum with tight junction right greater than left without Chiari  - If she is open to it, we can obtain follow up MRI Brain to further evaluate at anytime     Preventative:  - we discussed headache hygiene and lifestyle factors that may improve headaches  -  continue Galcanezumab-gnlm 120 MG/ML SOAJ; Inject 120 mg under the skin every 28 days (3 month supply so she doesn't have to wait for the prescription delayed every month). Discussed proper use, possible side effects and risks.  -     acetaZOLAMIDE (DIAMOX) 125 mg tablet; TAKE 1 TABLET BY MOUTH UPON AWAKENING AND EVERY 4 HOURS DURING THE DAY FOR 4-5 DOSES. Discussed proper use, off label use with certain meds, possible side effects and risks.  - On through other providers:  stopped around July 2024 -  folic acid,  hydroxychloriquine for years now,  ), duloxetine/cymbalta in am 30 mg in July 2024 and 8/27/2024 increased to 60 mg (for fibromyalgia pain - first two weeks cramps and N/V SE), Vit B12 1000 mcg,Vit D 50,000, atorvastatin/lipitor 10 mg,   - past/failed:  Amitriptyline, mirtazapine, topiramate,  Ambien, aimovig contraindicated due to constiptation and latex allergy, Chlorthalidone, acetazolamide SE dry mouth (on top of baseline dry mouth that she has just as bad  off of it due to Sjogren's) and tingling bothersome and wore off at 8 hours but couldn't feel she could increase to TID due to SE, metoclopramide/Reglan 10 mg  QID weaned off after visit Jan 2025 and just got off mid March 2024 due to possible TD to was on 3 months as of 1/28/25 - prior to bells palsy was getting a tic on her lip, Rabeprazole/Aciphex 40 mg twice daily for GERD   - future options:   alternative CGRP med, botox     Abortive:  - discussed not taking over-the-counter or prescription pain medications more than 3 days per week to prevent medication overuse/rebound headache  - through other providers:  Ondansetron helps nausea  -   rimegepant sulfate (Nurtec) 75 mg TBDP; Take one NURTEC 75 mg at onset under tongue. Limit 1 in 24 hours. Discussed proper use, possible side effects and risks.  -Rizatriptan 10 mg helps the most severe migraines, takes rarely due to SE. Discussed proper use, possible side effects and risks.  - past/failed:  Sumatriptan 50 mg 100 mg, rizatriptan   - past/helped: toradol IM helped, steroids tolerated - prefers not to have it due to stomach irritation   - future options:  prochlorperazine, metoclopramide, Toradol IM or p.o., could consider trial for 5 days of Depakote or dexamethasone 4 prolonged migraine, ubrelvy, reyvow     Patient instructions      *Please consider following up with PCP for low mood screen today which showed signs of depression and I would be happy to place referral for talk therapy or medication management a time if you are interested.    Please consider obtaining the labs -Lyme still pending from 1/22/25, someone ordered B12 9/12/2024 which did not get drawn but the iron panel that they did and when you get these labs we will also get a CMP just to recheck your electrolytes back on acetazolamide.     If symptoms are not improving or if worsening, can consider MRI brain although currently meets criteria for Bell's palsy which is not thought to be centrally  located    Agree with PT and acupuncture    - metoclopramide/Reglan glad that you have a GI wean you off this to make sure there is no aspects of tardive dyskinesia contributing since you are having mouth movements prior to the Bell's palsy diagnosis,- glad you are holding off this now and improved in case related    Either follow-up with sleep medicine for treatment of the sleep apnea or follow-up with ENT to assess for inspire device as untreated sleep apnea does raise your risk of stroke.      Headache/migraine treatment:   Abortive medications (for immediate treatment of a headache):   It is ok to take ibuprofen, acetaminophen or naproxen (Advil, Tylenol,  Aleve, Excedrin) if they help your headaches you should limit these to No more than 3 times a week to avoid medication overuse/rebound headaches.      For your more moderate to severe migraines take this medication early   -     rimegepant sulfate (Nurtec) 75 mg TBDP; Take one NURTEC 75 mg at onset under tongue. Limit 1 in 24 hours.    - will need a prior Auth which may take 1 to 2 weeks and if you do not hear anything let us know and there is a coupon card on the website for the co-pay.  If your insurance prefers Ubrelvy it is a similar medication I will send that in instead.    Rizatriptan 10 mg tabs - take one at the onset of headache. May repeat one time after 1-2 hours if pain has not resolved.   (Max 2 a day and 9 a month)      To try and break a migraine if needed in the future since didn't take yet:  -     divalproex sodium (Depakote) 500 mg DR tablet; 500 mg p.o. nightly for 1-15 nights for unrelenting migraine    Prescription preventive medications for headaches/migraines   (to take every day to help prevent headaches - not to take at the time of headache):  [x]Emgality/Galcanezumab  120 mg injections every 28 days     Keep out of direct sunlight. Prior to administration, allow to come to room temperature for 30 minutes. Do not warm using a heat  source (eg, microwave or hot water). Do not shake. Administer in abdomen (avoiding 2 inches around the navel), thigh, upper arm, or buttocks avoiding areas of skin that are tender, bruised, red or hard. Deliver entire contents of single-use prefilled pen or syringe.       Continue   -     acetaZOLAMIDE (DIAMOX) 125 mg tablet; TAKE 1 TABLET BY MOUTH UPON AWAKENING AND EVERY 4 HOURS DURING THE DAY FOR 5 DOSES       - if a lower dose is helpful, can stay lower, if higher dose causes side effects, go back to last tolerated dose.  If you get all the way up to the dose recommended and is not helping enough let me know as I will absolutely go higher.    - make sure to stay hydrated while on this as can cause dehydration since that is it's purpose to take fluid off.   - most common side effect is tingling of the nerves at times, especially if you had neuropathy in the past it may cause tingling in these nerves, this is common and typically should get better over time.  It also can make carbonated beverages taste flat or given metallic taste to things.  -It can cause decreased appetite and weight loss in some individuals  - can cause electrolyte disturbances, but not typically in any significant way. However, be cautious if taking with other meds that lower potassium like hydrochlorothiazide etc    It is important to try and eat potassium rich foods while taking this  Potassium rich foods include:  - bananas, yogurt, sweet potatoes, tomato sauces, greens,  kidney and lima beans, lentils, split peas, soybeans, prunes, carrot or Orange juice, fish, milk        *Typically these types of medications take time untill you see the benefit, although some may see improvement in days, often it may take weeks, especially if the medication is being titrated up to a beneficial level. Please contact us if there are any concerns or questions regarding the medication.         Lifestyle Recommendations:  [x] SLEEP - Maintain a regular sleep  schedule: Adults need at least 7-8 hours of uninterrupted a night. Maintain good sleep hygiene:  Going to bed and waking up at consistent times, avoiding excessive daytime naps, avoiding caffeinated beverages in the evening, avoid excessive stimulation in the evening and generally using bed primarily for sleeping.  One hour before bedtime would recommend turning lights down lower, decreasing your activity (may read quietly, listen to music at a low volume). When you get into bed, should eliminate all technology (no texting, emailing, playing with your phone, iPad or tablet in bed).  [x] HYDRATION - Maintain good hydration.  Drink  2L of fluid a day (4 typical small water bottles)  [x] DIET - Maintain good nutrition. In particular don't skip meals and try and eat healthy balanced meals regularly.  [x] TRIGGERS - Look for other triggers and avoid them: Limit caffeine to 1-2 cups a day or less. Avoid dietary triggers that you have noticed bring on your headaches (this could include aged cheese, peanuts, MSG, aspartame and nitrates).  [x] EXERCISE - physical exercise as we all know is good for you in many ways, and not only is good for your heart, but also is beneficial for your mental health, cognitive health and  chronic pain/headaches. I would encourage at the least 5 days of physical exercise weekly for at least 30 minutes.      Education and Follow-up  [x] Please call with any questions or concerns. Of course if any new concerning symptoms go to the emergency department.  [x] Follow up 2-3 months, sooner if needed   Orders:    Galcanezumab-gnlm 120 MG/ML SOAJ; Inject 120 mg under the skin every 28 days (3 month supply so she doesn't have to wait for the prescription delayed every month)    rimegepant sulfate (Nurtec) 75 mg TBDP; Take one NURTEC 75 mg at onset on (or under) tongue daily as needed for migraine    Comprehensive metabolic panel; Future    acetaZOLAMIDE (DIAMOX) 125 mg tablet; TAKE 1 TABLET BY MOUTH  UPON AWAKENING AND EVERY 4 HOURS DURING THE DAY FOR 5 DOSES

## 2025-03-17 NOTE — PROGRESS NOTES
Review of Systems   Constitutional:  Negative for appetite change, fatigue and fever.   HENT: Negative.  Negative for hearing loss, tinnitus, trouble swallowing and voice change.    Eyes: Negative.  Negative for photophobia, pain and visual disturbance.   Respiratory: Negative.  Negative for shortness of breath.    Cardiovascular: Negative.  Negative for palpitations.   Gastrointestinal: Negative.  Negative for nausea and vomiting.   Endocrine: Negative.  Negative for cold intolerance.   Genitourinary: Negative.  Negative for dysuria, frequency and urgency.   Musculoskeletal:  Negative for back pain, gait problem, myalgias, neck pain and neck stiffness.   Skin: Negative.  Negative for rash.   Allergic/Immunologic: Negative.    Neurological:  Positive for headaches (headaches 4 times a week  / migraines -once a week). Negative for dizziness, tremors, seizures, syncope, facial asymmetry, speech difficulty, weakness, light-headedness and numbness.   Hematological: Negative.  Does not bruise/bleed easily.   Psychiatric/Behavioral: Negative.  Negative for confusion, hallucinations and sleep disturbance.

## 2025-03-18 ENCOUNTER — TELEPHONE (OUTPATIENT)
Age: 60
End: 2025-03-18

## 2025-03-18 NOTE — TELEPHONE ENCOUNTER
Called and advised pt of the below. She verbalized understanding. Pt states that she has a psychiatrist that manages her depression/mood.     albert

## 2025-03-18 NOTE — TELEPHONE ENCOUNTER
----- Message from Natali Garcia MD sent at 3/17/2025 10:08 AM EDT -----  It was not until after the visit that I remembered that her depression screen was positive for the medical assistant routine check in screen, could you please check if she is following with anyone or if she would like referrals to anyone? otherwise would recommend continued PCP follow-up for mood. Thank you!

## 2025-04-28 ENCOUNTER — APPOINTMENT (OUTPATIENT)
Dept: LAB | Facility: HOSPITAL | Age: 60
End: 2025-04-28
Payer: MEDICARE

## 2025-04-28 ENCOUNTER — HOSPITAL ENCOUNTER (OUTPATIENT)
Dept: MAMMOGRAPHY | Facility: CLINIC | Age: 60
Discharge: HOME | End: 2025-04-28
Payer: MEDICARE

## 2025-04-28 ENCOUNTER — RESULTS FOLLOW-UP (OUTPATIENT)
Dept: HEMATOLOGY ONCOLOGY | Facility: CLINIC | Age: 60
End: 2025-04-28

## 2025-04-28 VITALS — HEIGHT: 64 IN | BODY MASS INDEX: 26.98 KG/M2 | WEIGHT: 158 LBS

## 2025-04-28 DIAGNOSIS — E55.9 VITAMIN D DEFICIENCY: ICD-10-CM

## 2025-04-28 DIAGNOSIS — E53.8 B12 DEFICIENCY: ICD-10-CM

## 2025-04-28 DIAGNOSIS — G25.81 RESTLESS LEG SYNDROME: ICD-10-CM

## 2025-04-28 DIAGNOSIS — M06.09 RHEUMATOID ARTHRITIS OF MULTIPLE SITES WITH NEGATIVE RHEUMATOID FACTOR (HCC): ICD-10-CM

## 2025-04-28 DIAGNOSIS — M35.00 SICCA SYNDROME (HCC): ICD-10-CM

## 2025-04-28 DIAGNOSIS — Z12.31 ENCOUNTER FOR SCREENING MAMMOGRAM FOR MALIGNANT NEOPLASM OF BREAST: ICD-10-CM

## 2025-04-28 DIAGNOSIS — N20.0 NEPHROLITHIASIS: ICD-10-CM

## 2025-04-28 DIAGNOSIS — E78.00 HYPERCHOLESTEROLEMIA: ICD-10-CM

## 2025-04-28 DIAGNOSIS — E61.1 IRON DEFICIENCY: ICD-10-CM

## 2025-04-28 LAB
25(OH)D3 SERPL-MCNC: 24.2 NG/ML (ref 30–100)
ALBUMIN SERPL BCG-MCNC: 4.1 G/DL (ref 3.5–5)
ALP SERPL-CCNC: 78 U/L (ref 34–104)
ALT SERPL W P-5'-P-CCNC: 9 U/L (ref 7–52)
AST SERPL W P-5'-P-CCNC: 11 U/L (ref 13–39)
BASOPHILS # BLD AUTO: 0.03 THOUSANDS/ÂΜL (ref 0–0.1)
BASOPHILS # BLD AUTO: 0.03 THOUSANDS/ÂΜL (ref 0–0.1)
BASOPHILS NFR BLD AUTO: 1 % (ref 0–1)
BASOPHILS NFR BLD AUTO: 1 % (ref 0–1)
BILIRUB DIRECT SERPL-MCNC: 0.03 MG/DL (ref 0–0.2)
BILIRUB SERPL-MCNC: 0.35 MG/DL (ref 0.2–1)
C3 SERPL-MCNC: 163 MG/DL (ref 87–200)
C4 SERPL-MCNC: 41 MG/DL (ref 19–52)
CHOLEST SERPL-MCNC: 230 MG/DL (ref ?–200)
CREAT SERPL-MCNC: 0.89 MG/DL (ref 0.6–1.3)
CRP SERPL QL: 1 MG/L
EOSINOPHIL # BLD AUTO: 0.22 THOUSAND/ÂΜL (ref 0–0.61)
EOSINOPHIL # BLD AUTO: 0.22 THOUSAND/ÂΜL (ref 0–0.61)
EOSINOPHIL NFR BLD AUTO: 4 % (ref 0–6)
EOSINOPHIL NFR BLD AUTO: 4 % (ref 0–6)
ERYTHROCYTE [DISTWIDTH] IN BLOOD BY AUTOMATED COUNT: 13.1 % (ref 11.6–15.1)
ERYTHROCYTE [DISTWIDTH] IN BLOOD BY AUTOMATED COUNT: 13.2 % (ref 11.6–15.1)
ERYTHROCYTE [SEDIMENTATION RATE] IN BLOOD: 23 MM/HOUR (ref 0–29)
FERRITIN SERPL-MCNC: 255 NG/ML (ref 30–307)
GFR SERPL CREATININE-BSD FRML MDRD: 70 ML/MIN/1.73SQ M
HCT VFR BLD AUTO: 41.2 % (ref 34.8–46.1)
HCT VFR BLD AUTO: 42.2 % (ref 34.8–46.1)
HDLC SERPL-MCNC: 38 MG/DL
HGB BLD-MCNC: 13 G/DL (ref 11.5–15.4)
HGB BLD-MCNC: 13.1 G/DL (ref 11.5–15.4)
IMM GRANULOCYTES # BLD AUTO: 0.01 THOUSAND/UL (ref 0–0.2)
IMM GRANULOCYTES # BLD AUTO: 0.02 THOUSAND/UL (ref 0–0.2)
IMM GRANULOCYTES NFR BLD AUTO: 0 % (ref 0–2)
IMM GRANULOCYTES NFR BLD AUTO: 0 % (ref 0–2)
IRON SATN MFR SERPL: 42 % (ref 15–50)
IRON SERPL-MCNC: 93 UG/DL (ref 50–212)
LDLC SERPL CALC-MCNC: 165 MG/DL (ref 0–100)
LYMPHOCYTES # BLD AUTO: 1.74 THOUSANDS/ÂΜL (ref 0.6–4.47)
LYMPHOCYTES # BLD AUTO: 1.82 THOUSANDS/ÂΜL (ref 0.6–4.47)
LYMPHOCYTES NFR BLD AUTO: 28 % (ref 14–44)
LYMPHOCYTES NFR BLD AUTO: 30 % (ref 14–44)
MCH RBC QN AUTO: 30.2 PG (ref 26.8–34.3)
MCH RBC QN AUTO: 31.1 PG (ref 26.8–34.3)
MCHC RBC AUTO-ENTMCNC: 30.8 G/DL (ref 31.4–37.4)
MCHC RBC AUTO-ENTMCNC: 31.8 G/DL (ref 31.4–37.4)
MCV RBC AUTO: 98 FL (ref 82–98)
MCV RBC AUTO: 98 FL (ref 82–98)
MONOCYTES # BLD AUTO: 0.37 THOUSAND/ÂΜL (ref 0.17–1.22)
MONOCYTES # BLD AUTO: 0.38 THOUSAND/ÂΜL (ref 0.17–1.22)
MONOCYTES NFR BLD AUTO: 6 % (ref 4–12)
MONOCYTES NFR BLD AUTO: 6 % (ref 4–12)
NEUTROPHILS # BLD AUTO: 3.57 THOUSANDS/ÂΜL (ref 1.85–7.62)
NEUTROPHILS # BLD AUTO: 3.77 THOUSANDS/ÂΜL (ref 1.85–7.62)
NEUTS SEG NFR BLD AUTO: 59 % (ref 43–75)
NEUTS SEG NFR BLD AUTO: 61 % (ref 43–75)
NRBC BLD AUTO-RTO: 0 /100 WBCS
NRBC BLD AUTO-RTO: 0 /100 WBCS
PLATELET # BLD AUTO: 207 THOUSANDS/UL (ref 149–390)
PLATELET # BLD AUTO: 210 THOUSANDS/UL (ref 149–390)
PMV BLD AUTO: 9.6 FL (ref 8.9–12.7)
PMV BLD AUTO: 9.6 FL (ref 8.9–12.7)
PROT SERPL-MCNC: 7 G/DL (ref 6.4–8.4)
RBC # BLD AUTO: 4.21 MILLION/UL (ref 3.81–5.12)
RBC # BLD AUTO: 4.3 MILLION/UL (ref 3.81–5.12)
TIBC SERPL-MCNC: 221.2 UG/DL (ref 250–450)
TRANSFERRIN SERPL-MCNC: 158 MG/DL (ref 203–362)
TRIGL SERPL-MCNC: 135 MG/DL (ref ?–150)
TSH SERPL DL<=0.05 MIU/L-ACNC: 1.85 UIU/ML (ref 0.45–4.5)
UIBC SERPL-MCNC: 128 UG/DL (ref 155–355)
VIT B12 SERPL-MCNC: 411 PG/ML (ref 180–914)
WBC # BLD AUTO: 6.02 THOUSAND/UL (ref 4.31–10.16)
WBC # BLD AUTO: 6.16 THOUSAND/UL (ref 4.31–10.16)

## 2025-04-28 PROCEDURE — 86200 CCP ANTIBODY: CPT

## 2025-04-28 PROCEDURE — 86235 NUCLEAR ANTIGEN ANTIBODY: CPT

## 2025-04-28 PROCEDURE — 83550 IRON BINDING TEST: CPT

## 2025-04-28 PROCEDURE — 80061 LIPID PANEL: CPT

## 2025-04-28 PROCEDURE — 82728 ASSAY OF FERRITIN: CPT

## 2025-04-28 PROCEDURE — 80076 HEPATIC FUNCTION PANEL: CPT

## 2025-04-28 PROCEDURE — 85025 COMPLETE CBC W/AUTO DIFF WBC: CPT

## 2025-04-28 PROCEDURE — 36415 COLL VENOUS BLD VENIPUNCTURE: CPT

## 2025-04-28 PROCEDURE — 84443 ASSAY THYROID STIM HORMONE: CPT

## 2025-04-28 PROCEDURE — 86225 DNA ANTIBODY NATIVE: CPT

## 2025-04-28 PROCEDURE — 82306 VITAMIN D 25 HYDROXY: CPT

## 2025-04-28 PROCEDURE — 83540 ASSAY OF IRON: CPT

## 2025-04-28 PROCEDURE — 85652 RBC SED RATE AUTOMATED: CPT

## 2025-04-28 PROCEDURE — 86038 ANTINUCLEAR ANTIBODIES: CPT

## 2025-04-28 PROCEDURE — 77063 BREAST TOMOSYNTHESIS BI: CPT

## 2025-04-28 PROCEDURE — 77067 SCR MAMMO BI INCL CAD: CPT

## 2025-04-28 PROCEDURE — 86140 C-REACTIVE PROTEIN: CPT

## 2025-04-28 PROCEDURE — 82607 VITAMIN B-12: CPT

## 2025-04-28 PROCEDURE — 86160 COMPLEMENT ANTIGEN: CPT

## 2025-04-28 PROCEDURE — 82565 ASSAY OF CREATININE: CPT

## 2025-04-29 DIAGNOSIS — E78.00 HYPERCHOLESTEROLEMIA: ICD-10-CM

## 2025-04-29 LAB
CCP AB SER IA-ACNC: 0.7 (ref ?–10)
DSDNA IGG SERPL IA-ACNC: <0.9 IU/ML (ref ?–15)
ENA SS-A AB SER IA-ACNC: <0.5 U/ML (ref ?–10)
ENA SS-B IGG SER IA-ACNC: <0.6 U/ML (ref ?–10)
NUCLEAR IGG SER IA-RTO: 0.2 RATIO (ref ?–1)

## 2025-04-29 RX ORDER — ATORVASTATIN CALCIUM 10 MG/1
10 TABLET, FILM COATED ORAL
Qty: 90 TABLET | Refills: 1 | Status: SHIPPED | OUTPATIENT
Start: 2025-04-29

## 2025-05-13 ENCOUNTER — OFFICE VISIT (OUTPATIENT)
Dept: GASTROENTEROLOGY | Facility: CLINIC | Age: 60
End: 2025-05-13
Payer: MEDICARE

## 2025-05-13 VITALS
OXYGEN SATURATION: 98 % | DIASTOLIC BLOOD PRESSURE: 74 MMHG | TEMPERATURE: 97.5 F | SYSTOLIC BLOOD PRESSURE: 112 MMHG | HEART RATE: 75 BPM | HEIGHT: 64 IN | WEIGHT: 159 LBS | BODY MASS INDEX: 27.14 KG/M2

## 2025-05-13 DIAGNOSIS — K58.1 IRRITABLE BOWEL SYNDROME WITH CONSTIPATION: ICD-10-CM

## 2025-05-13 DIAGNOSIS — K31.84 GASTROPARESIS: ICD-10-CM

## 2025-05-13 DIAGNOSIS — K21.9 GASTROESOPHAGEAL REFLUX DISEASE, UNSPECIFIED WHETHER ESOPHAGITIS PRESENT: Primary | ICD-10-CM

## 2025-05-13 PROCEDURE — 99213 OFFICE O/P EST LOW 20 MIN: CPT | Performed by: PHYSICIAN ASSISTANT
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
## 2025-05-19 ENCOUNTER — RESULTS FOLLOW-UP (OUTPATIENT)
Dept: INFUSION CENTER | Facility: CLINIC | Age: 60
End: 2025-05-19

## 2025-06-03 ENCOUNTER — TELEMEDICINE (OUTPATIENT)
Dept: NEUROLOGY | Facility: CLINIC | Age: 60
End: 2025-06-03
Payer: MEDICARE

## 2025-06-03 ENCOUNTER — TELEPHONE (OUTPATIENT)
Dept: NEUROLOGY | Facility: CLINIC | Age: 60
End: 2025-06-03

## 2025-06-03 ENCOUNTER — OFFICE VISIT (OUTPATIENT)
Dept: INTERNAL MEDICINE CLINIC | Age: 60
End: 2025-06-03
Payer: MEDICARE

## 2025-06-03 VITALS
OXYGEN SATURATION: 97 % | WEIGHT: 155 LBS | BODY MASS INDEX: 26.46 KG/M2 | TEMPERATURE: 97.5 F | HEIGHT: 64 IN | DIASTOLIC BLOOD PRESSURE: 76 MMHG | SYSTOLIC BLOOD PRESSURE: 120 MMHG | HEART RATE: 83 BPM

## 2025-06-03 DIAGNOSIS — M85.89 OSTEOPENIA OF MULTIPLE SITES: ICD-10-CM

## 2025-06-03 DIAGNOSIS — G47.33 OSA (OBSTRUCTIVE SLEEP APNEA): ICD-10-CM

## 2025-06-03 DIAGNOSIS — F41.9 ANXIETY: ICD-10-CM

## 2025-06-03 DIAGNOSIS — E78.00 HYPERCHOLESTEROLEMIA: Primary | ICD-10-CM

## 2025-06-03 DIAGNOSIS — E61.1 IRON DEFICIENCY: ICD-10-CM

## 2025-06-03 DIAGNOSIS — M35.05 SJOGREN SYNDROME WITH INFLAMMATORY ARTHRITIS (HCC): ICD-10-CM

## 2025-06-03 DIAGNOSIS — G43.009 MIGRAINE WITHOUT AURA AND WITHOUT STATUS MIGRAINOSUS, NOT INTRACTABLE: Primary | ICD-10-CM

## 2025-06-03 DIAGNOSIS — K21.9 GASTROESOPHAGEAL REFLUX DISEASE WITHOUT ESOPHAGITIS: ICD-10-CM

## 2025-06-03 DIAGNOSIS — E55.9 VITAMIN D DEFICIENCY: ICD-10-CM

## 2025-06-03 DIAGNOSIS — M06.09 RHEUMATOID ARTHRITIS OF MULTIPLE SITES WITH NEGATIVE RHEUMATOID FACTOR (HCC): ICD-10-CM

## 2025-06-03 PROCEDURE — G2211 COMPLEX E/M VISIT ADD ON: HCPCS | Performed by: FAMILY MEDICINE

## 2025-06-03 PROCEDURE — G2211 COMPLEX E/M VISIT ADD ON: HCPCS | Performed by: PSYCHIATRY & NEUROLOGY

## 2025-06-03 PROCEDURE — 99214 OFFICE O/P EST MOD 30 MIN: CPT | Performed by: FAMILY MEDICINE

## 2025-06-03 PROCEDURE — 99215 OFFICE O/P EST HI 40 MIN: CPT | Performed by: PSYCHIATRY & NEUROLOGY

## 2025-06-03 RX ORDER — ERGOCALCIFEROL 1.25 MG/1
50000 CAPSULE, LIQUID FILLED ORAL WEEKLY
Qty: 12 CAPSULE | Refills: 1 | Status: SHIPPED | OUTPATIENT
Start: 2025-06-03

## 2025-06-03 RX ORDER — RIZATRIPTAN BENZOATE 10 MG/1
TABLET ORAL
Qty: 9 TABLET | Refills: 11 | Status: SHIPPED | OUTPATIENT
Start: 2025-06-03

## 2025-06-03 NOTE — PROGRESS NOTES
Assessment/Plan:    1. Hypercholesterolemia  -     Lipid Panel with Direct LDL reflex; Future; Expected date: 12/03/2025 (Use expected date for collection)  -     Comprehensive metabolic panel; Future; Expected date: 12/03/2025 (Use expected date for collection)  2. Iron deficiency  -     Iron Panel (Includes Ferritin, Iron Sat%, Iron, and TIBC); Future; Expected date: 12/03/2025 (Use expected date for collection)  3. DIPTI (obstructive sleep apnea)  4. Rheumatoid arthritis of multiple sites with negative rheumatoid factor (HCC)  5. Sjogren syndrome with inflammatory arthritis (HCC)  6. Anxiety  7. Vitamin D deficiency  -     ergocalciferol (VITAMIN D2) 50,000 units; Take 1 capsule (50,000 Units total) by mouth once a week  8. Gastroesophageal reflux disease without esophagitis  9. Osteopenia of multiple sites          There are no Patient Instructions on file for this visit.    Return in about 6 months (around 12/3/2025).    Subjective:      Patient ID: Baylee Villafuerte is a 59 y.o. female.    Chief Complaint   Patient presents with    Follow-up     6 month- labs done 4/28-  Patient had visit with neuro        HPI    Bell's palsy, following with neurology into the ER had high-dose steroids and acupuncture as well as antiviral medications given.  Symptoms are improving significantly and resolving.    Rheumatoid arthritis with out positive rheumatoid factor.  Seeing Dr. Watkins rheumatologist and started on hydroxychloroquine.  Patient is concerned that that may be making her retinal issues worse however patient has had retinal issues for quite some time and is following very closely with Ophthalmology.  She is not having any other side effects from the medication.  December 2023, recently off rheumatoid medications due to urinary tract infection, will start next week, feels that she has a flare when she is not on them.  Garcia are hard on her joints  June 2024, patient seeing rheumatology.  Rheumatology placed  her back on prednisone and she continues with methotrexate but cannot be weaned off the methotrexate.  They feel that her musculoskeletal symptoms are largely related to Sjogren's     GERD, taking PPI and falls with specialist.  Well controlled.     Diastolic hypertension.  Continue to monitor.  Tried to you will limit her salt intake.  December 2022, compliant with chlorothiazide own.  Has hypokalemia on blood test results.  December 2023, well-controlled, no issues   June 2024, controlled no issues  November 2024, well-controlled  June 2025, well-controlled, no new issues       Migraines.  Relatively well controlled with medication.  Slightly exacerbated with COVID-19 infection. Nov 2021 started on emgality by neuro and is working, does not take imitrex now  May 2022: stable  December 2022, stable  December 2023, on Maxide by neurology which has been working well however her eye doctor who treats her Sjogren's syndrome does not want her on this medication.  Patient is tolerating well except it makes her urinate frequently  June 2024, migraines have been largely stable  November 2024, stable no breakthrough issues  June 2025, still having dry mouth.  Feels irritated by this and following regularly with her specialist      Osteopenia of multiple sites.  Due for vitamin-D level.may 2022, did not get labs done, due for DEXA July 2022 December 2023, taking calcium and vitamin D, due for repeat bone density study summer 2024  June 2024, stable T-score  June 2025, doing well with calcium and vitamin D not due for bone density study at     Pancreatitis resolved but has a history of frequency.  December 2022, her rheumatologist thinks that this was all related to Sjogren syndrome as well as rheumatoid arthritis.     Hyperlipidemia, tolerating atorvastatin.  Due for labs.nov 2021 stopped Lipitor but not sure why   May 2022: taking meds. Due for labs   December 2022, last lipid levels noted.  Has script for new test  June  2024 due for lipid level  November 2024, due for lab work  June 2025 patient had blood work done in April which showed worsening LDL, for some reason she ran out of her Lipitor and did not call for refills.  LDL was 165.     Insomnia primary, stable with Ambien which is not new for her., May 2022:  Stable  June 2024, not sleeping well at all which is her usual.  Stopped Ambien due to habit-forming tendency  November 2024, fluctuates  June 2025, stable no issues     Arthralgias: wasnt able to see rheumatology due to COVID restrictions, she would like another referral    December 2022, ongoing treatment with Rheumatology.  Diagnosed with rheumatoid arthritis and placed on hydroxychloroquine.  Has flare-ups from time to time and is trying to change her life to avoid this.  December 2023, recently off rheumatoid medications due to urinary tract infection, will start next week, feels that she has a flare when she is not on them.  Garcia are hard on her joints  June 2024, see under rheumatology, symptoms largely related to Sjogren's, recently started on Cymbalta 2 days ago  November 2024, relatively stable, always has pain on a daily basis following with rheumatology  June 2025, taken off methotrexate to see if she has any improvement, she does not want to take it due to long-term side effects.  Tolerating Plaquenil.  Pain did not get any worse with stopping methotrexate                The following portions of the patient's history were reviewed and updated as appropriate: allergies, current medications, past family history, past medical history, past social history, past surgical history and problem list.    Review of Systems      Constitutional:  Denies fever or chills   Eyes:  Denies double , blurry vision or eye pain  HENT:  Denies nasal congestion, sore throat or new hearing issues  Respiratory:  Denies cough or shortness of breath or wheezing  Cardiovascular:  Denies palpitations or chest pain  GI:  Denies  "abdominal pain, nausea, or vomiting, no loose stools, no reflux  Integument:  Denies rash , no open areas  Neurologic:  Denies headache or focal weakness, no dizziness  : no dysuria, or hematuria        Current Medications[1]    Objective:    /76 (BP Location: Left arm, Patient Position: Sitting, Cuff Size: Standard)   Pulse 83   Temp 97.5 °F (36.4 °C) (Temporal)   Ht 5' 4\" (1.626 m)   Wt 70.3 kg (155 lb)   SpO2 97%   BMI 26.61 kg/m²        Physical Exam      Constitutional:  Well developed, well nourished, no acute distress, non-toxic appearance   Eyes:  PERRL, conjunctiva normal , non icteric sclera  HENT:  Atraumatic, oropharynx moist. Neck-  supple   Respiratory:  CTA b/l, normal breath sounds, no rales, no wheezing   Cardiovascular:  RRR, no murmurs, no LE edema b/l  GI:  Soft, nondistended, normal bowel sounds x 4, nontender, no organomegaly, no mass, no rebound, no guarding   Neurologic:   R sided bells palsy  Psychiatric:  Speech and behavior appropriate , AAO x 3      Jadiel Young DO         [1]   Current Outpatient Medications   Medication Sig Dispense Refill    acetaZOLAMIDE (DIAMOX) 125 mg tablet TAKE 1 TABLET BY MOUTH UPON AWAKENING AND EVERY 4 HOURS DURING THE DAY FOR 5 DOSES 450 tablet 4    atorvastatin (LIPITOR) 10 mg tablet Take 1 tablet (10 mg total) by mouth daily at bedtime 90 tablet 1    Auvi-Q 0.3 MG/0.3ML SOAJ as needed Epi pen      carboxymethylcellulose (REFRESH PLUS) 0.5 % SOLN Administer 2 drops to both eyes as needed in the morning and 2 drops as needed in the evening for dry eyes.      ergocalciferol (VITAMIN D2) 50,000 units Take 1 capsule (50,000 Units total) by mouth once a week 12 capsule 1    folic acid (FOLVITE) 1 mg tablet Take 2,000 mcg by mouth in the morning and 2,000 mcg before bedtime.      Galcanezumab-gnlm 120 MG/ML SOAJ Inject 120 mg under the skin every 28 days (3 month supply so she doesn't have to wait for the prescription delayed every month) 3 mL 4    " hydroxychloroquine (PLAQUENIL) 200 mg tablet Take 200 mg by mouth in the morning and 200 mg in the evening. Take with meals.      ketoconazole (NIZORAL) 2 % cream Apply topically daily For 2 weeks when red scaly spot appears on forehead 30 g 2    methocarbamol (Robaxin-750) 750 mg tablet Take 1 tablet (750 mg total) by mouth 3 (three) times a day as needed for muscle spasms 30 tablet 3    ondansetron (ZOFRAN-ODT) 4 mg disintegrating tablet Take 1 tablet (4 mg total) by mouth every 8 (eight) hours as needed for nausea or vomiting 60 tablet 6    rimegepant sulfate (Nurtec) 75 mg TBDP Take one NURTEC 75 mg at onset on (or under) tongue daily as needed for migraine 16 tablet 11    rizatriptan (MAXALT) 10 mg tablet Take 1 tab at onset may repeat in 2 hours if needed, max 2 per 24 hours, 3/week, 9/month 9 tablet 11    VIT B12-METHIONINE-INOS-CHOL IM       Xiidra 5 % op solution Administer to both eyes in the morning and in the evening.      methotrexate 2.5 MG tablet Take 2.5 mg by mouth once a week 4 tablets on Monday and 2 tablets on Tuesday (Patient not taking: Reported on 6/3/2025)       No current facility-administered medications for this visit.

## 2025-06-03 NOTE — PATIENT INSTRUCTIONS
*Please consider following up with PCP for low mood screen today which showed signs of depression and I would be happy to place referral for talk therapy or medication management a time if you are interested, but I would generally defer to PCP for this and glad that you are seeing her today.    CMP ordered at past visit but appears they ellen other labs did not draw the lyme either from other doc - obtain before next visit   - Reordered again for monitoring on diamox    - re metoclopramide/Reglan glad that you had GI wean you off this to make sure there is no aspects of tardive dyskinesia contributing since you are having mouth movements prior to the Bell's palsy diagnosis,- glad you are holding off this now and improved in case related    Either follow-up with sleep medicine for treatment of the sleep apnea or follow-up with ENT to assess for inspire device as untreated sleep apnea does raise your risk of stroke among many other issues.      Headache/migraine treatment:   Abortive medications (for immediate treatment of a headache):   It is ok to take ibuprofen, acetaminophen or naproxen (Advil, Tylenol,  Aleve, Excedrin) if they help your headaches you should limit these to No more than 3 times a week to avoid medication overuse/rebound headaches.      For your more moderate to severe migraines take this medication early   -     rimegepant sulfate (Nurtec) 75 mg TBDP; Take one NURTEC 75 mg at onset under tongue. Limit 1 in 24 hours.      Rizatriptan 10 mg tabs - take one at the onset of headache. May repeat one time after 1-2 hours if pain has not resolved.   (Max 2 a day and 9 a month)      To try and break a migraine if needed in the future since didn't take yet:  -     divalproex sodium (Depakote) 500 mg DR tablet; 500 mg p.o. nightly for 1-15 nights for unrelenting migraine    Prescription preventive medications for headaches/migraines   (to take every day to help prevent headaches - not to take at the time of  headache):  [x]Emgality/Galcanezumab  120 mg injections every 28 days     Keep out of direct sunlight. Prior to administration, allow to come to room temperature for 30 minutes. Do not warm using a heat source (eg, microwave or hot water). Do not shake. Administer in abdomen (avoiding 2 inches around the navel), thigh, upper arm, or buttocks avoiding areas of skin that are tender, bruised, red or hard. Deliver entire contents of single-use prefilled pen or syringe.       Continue   -     acetaZOLAMIDE (DIAMOX) 125 mg tablet; TAKE 1 TABLET BY MOUTH UPON AWAKENING AND EVERY 4 HOURS DURING THE DAY FOR 5 DOSES       - if a lower dose is helpful, can stay lower, if higher dose causes side effects, go back to last tolerated dose.  If you get all the way up to the dose recommended and is not helping enough let me know as I will absolutely go higher.    - make sure to stay hydrated while on this as can cause dehydration since that is it's purpose to take fluid off.   - most common side effect is tingling of the nerves at times, especially if you had neuropathy in the past it may cause tingling in these nerves, this is common and typically should get better over time.  It also can make carbonated beverages taste flat or given metallic taste to things.  -It can cause decreased appetite and weight loss in some individuals  - can cause electrolyte disturbances, but not typically in any significant way. However, be cautious if taking with other meds that lower potassium like hydrochlorothiazide etc    It is important to try and eat potassium rich foods while taking this  Potassium rich foods include:  - bananas, yogurt, sweet potatoes, tomato sauces, greens,  kidney and lima beans, lentils, split peas, soybeans, prunes, carrot or Orange juice, fish, milk        *Typically these types of medications take time untill you see the benefit, although some may see improvement in days, often it may take weeks, especially if the  medication is being titrated up to a beneficial level. Please contact us if there are any concerns or questions regarding the medication.         Lifestyle Recommendations:  [x] SLEEP - Maintain a regular sleep schedule: Adults need at least 7-8 hours of uninterrupted a night. Maintain good sleep hygiene:  Going to bed and waking up at consistent times, avoiding excessive daytime naps, avoiding caffeinated beverages in the evening, avoid excessive stimulation in the evening and generally using bed primarily for sleeping.  One hour before bedtime would recommend turning lights down lower, decreasing your activity (may read quietly, listen to music at a low volume). When you get into bed, should eliminate all technology (no texting, emailing, playing with your phone, iPad or tablet in bed).  [x] HYDRATION - Maintain good hydration.  Drink  2L of fluid a day (4 typical small water bottles)  [x] DIET - Maintain good nutrition. In particular don't skip meals and try and eat healthy balanced meals regularly.  [x] TRIGGERS - Look for other triggers and avoid them: Limit caffeine to 1-2 cups a day or less. Avoid dietary triggers that you have noticed bring on your headaches (this could include aged cheese, peanuts, MSG, aspartame and nitrates).  [x] EXERCISE - physical exercise as we all know is good for you in many ways, and not only is good for your heart, but also is beneficial for your mental health, cognitive health and  chronic pain/headaches. I would encourage at the least 5 days of physical exercise weekly for at least 30 minutes.      Education and Follow-up  [x] Please call with any questions or concerns. Of course if any new concerning symptoms go to the emergency department.  [x] Follow up 3-4 months, sooner if needed

## 2025-06-03 NOTE — PROGRESS NOTES
Virtual Regular Visit  Name: Baylee Villafuerte      : 1965      MRN: [id number]  Encounter Provider: Natali Garcia MD  Encounter Date: 6/3/2025   Encounter department: [facility]  :  Assessment & Plan  Migraine without aura and without status migrainosus, not intractable    Assessment/Plan:   Baylee Reza is a very pleasant 59 y.o. female with a past medical history that includes migraines, anxiety, depression, arthralgia, HTN, GERD,  IBS, hypercholesteremia, iron deficiency anemia, kidney stones, osteopenia, insomnia, bells palsy referred here for evaluation of headache.  My initial evaluation 2020     Migraine without aura without status migrainosus, not intractable  Features of idiopathic intracranial hypertension on imaging not meeting criteria for IIH without papilledema  DIPTI not currently using CPAP (home study, 23, BRANDI 16.1, supine 18.8, non supine 3.5, Oxygen down to 75%, while on acetazolamide/Diamox which also can treat sleep apnea and likely makes this number lower than it is) -following with sleep medicine -2024 also placed referral to ENT to consider inspire since no longer using CPAP, Again discussed importance of this, morbidity/mortality and how I think it is a root problem with a lot of her issues  She reports a long history of headaches and migraines that have varied in frequency and severity throughout life.  Pain is typically left frontal/retro-orbital and sometimes in the back.  She denies aura and reports typical associated migrainous features.  - as of 2020: chronic daily headaches and Migraines 3-4 days per week for the past year or so  - as of 2021:   Migraines have drastically improved to only 2-3 per month.  She did not start any new preventative and since she is so improved will hold off on adding anything at this time.  Continue sumatriptan 100 mg as needed for abortive.  - as of 2021: She reports Migraines a  little worse to about 4 migraines a month, willing to add preventative as sumatriptan does not always work well, trial of emgality.   - as of 1/27/2022: Emgality helped a little initially after first loading dose and then pharmacy said she did not have refills and she did not call us so has not had since Sept. Now under a lot of stress, getting about 4 migraines a month, daily mild migraines/headaches. Resume emgality one shot monthly. Continue sumatriptan for abortive.   - as of 7/28/2022: She reports significant improvement on emgality with decreased frequency and severity of migraines to mild 4 a month, and 2 severe a month. Did not realize she could still take sumatriptan so was just taking OTC meds, refilled sumatriptan/imitrex. toradol IM today for migraine since yesterday.  - as of 1/20/2023: She was doing great on Emgality, but last dose was in September or October due to mom passing away and had to leave town to help. Will resume emgality monthly as migraines are again 2-3 days a week, trial of rizatriptan in place of sumatriptan.   - as of 6/21/2023: She reports improvement on emgality, but not as significant improvement as would be expected and she believes due to the lapse in getting it and we discussed I believe it may be due to DIPTI causing subclinical IIH picture and highly recommend trial of acetazolamide/Diamox.  I believe subclinical IIH actually may be contributing to more symptoms than realized due to tight cerebellar anatomy.  Sleep study to evaluate for sleep apnea although home study can miss it, will likely need in lab.  We discussed this may be why she needs to sleep aids, blood pressure pill and it may be contributing to her vision issues that she has been told are cataracts only.  We discussed sleep apnea can cause significant morbidity/mortality issues if untreated.  Trial of rizatriptan works better than sumatriptan although she is not taking it lately due to starting methotrexate and was  told not to take anything on it.  Review of recent CT shows multiple signs of increased intracranial pressure.   - as of 9/21/23: She reports improvement since last visit with on average 2 headaches a week due to emgality decreasing her migraines and acetazolamide 125 mg TID decreasing suspected subtle increased intracranial pressure contributing, following closely with eye doctors and upcoming cataract surgery and no history of papilledema to call this IIH per standard criteria. PCP stopped her chlorthalidone due to BP normalizing. She reports being able to sleep longer and nap more, and I again encouraged scheduling sleep study, interestingly diamox also treats some forms of sleep apnea. Will increase diamox as tolerated to 125 mg 4 times a day while a wake to try and prevent wearing off and add night time dosing if needed when it would be wearing off the longest.  - as of 11/14/2023: She reports migraines remain improved on Emgality with on average 3 a week and rizatriptan typically helps without side effects.  She reports acetazolamide/Diamox 125 mg every 4 hours is helping although likely not enough especially since she is hardly sleeping at all and we discussed switching to 500 mg twice daily to see if this can help more with less wearing off effect and rebound high blood pressure and can help last throughout the night.  She indeed was diagnosed with sleep apnea at a moderate level and we discussed I highly recommend CPAP and nothing less for treatment despite being positional and she will be following up with sleep medicine in January.  In the meantime if needed can sleep on side or propped up in chair.  Following with eye doctor with persistent vision issues and he consistently does not see papilledema or signs that this would be related although we discussed I think it may be potentially impacting cataracts and she does have upcoming cataract surgery.   - as of 2/22/2024: She has had a difficult time  tolerating CPAP thus far and we discussed some of the symptoms she thinks are related to the CPAP side effects sound potentially related to acetazolamide wearing off at 8-hour lucia rather than 12 and could consider increasing to 500 mg long-acting every 8 hours however she reports bothersome side effects and therefore will instead transition to previous 125 mg every 4 hours for 1 to 2 weeks and then can take as needed if needed.  I suspect her headaches could get worse off of this medication, but treating the sleep apnea would help with this, and she received a new mask today that she is going to try.  Waking up with headaches also likely related to wearing off effect, otherwise 2-3 a week.  Emgality has decreased migraine days and will continue.  Rizatriptan worked more in the past and will add trial of Nurtec to see if this can help more and also help with prevention the more she takes it as well as with wearing off of emgality.   - as of 2024: She reports 1-2 migraines a week and rizatriptan helps sometimes and not others and she never received the Nurtec trial.  This is off of Emgality the last 2 months with last dose in March.  It appears the prior authorization  in January and we were not aware (but got through March due to 3 month supply), recommended she let us know in the future and will have the nursing team work on the prior Auth now.  Will also have the nursing team look into why the prior authorization was never sent to us for Nurtec can help with this as the more she takes Nurtec it also can help with prevention. She followed up with sleep medicine, they changed her from nasal pillow to fullface, decrease the pressure and is now using her CPAP more regularly, although may not use it some nights on average 7 and half hours with residual AHI 2.2.   - as of 2024: She reports headaches and migraines have improved since last visit and now on average once a week that improves with Nurtec.   She is not currently treating her sleep apnea and we discussed the potential morbidity/mortality of untreated sleep apnea especially at the moderate level with oxygen dropping to 75% and likely underestimated while on acetazolamide/Diamox at the time of the study.  I would recommend she make a follow-up with sleep medicine to discuss sleep apnea being untreated and other options.  She is open to referral to Dr. Munoz to discuss inspire as an alternative as she already wanted to follow-up with ENT as well.  Continue Emgality for migraine prevention.  Since last visit others added duloxetine/Cymbalta for fibromyalgia and she will be increasing it from 30 mg to 60 mg soon.   - as of 1/28/2025: She reports headaches have been exacerbated by recent diagnosis of Bell's palsy 1/13/2025 with complete paralysis of the left side of the face including forehead.  She was evaluated in ER where noncontrast head CT was read as no acute pathology, given Valtrex and prednisone and PCP finish the prednisone course, she unfortunately then had oral thrush and is establishing care with physical therapy in 2 days for their assistance with the Bell's palsy as the left facial weakness remains significant.  This has been associated with a severe persistent headache with migrainous features and since she had tolerated and found acetazolamide helpful in the past we added this back 1/22/2025 (took 5 days to get) and I recommend continuing at least while headache is this bad due to the untreated sleep apnea which I still would recommend following up with ENT for consideration of inspire device.  We discussed we have multiple other medications to try and break this cycle and she would like to proceed with trial of Depakote 500 mg at night and will refill dexamethasone for if severe although she is aware of the risk/side effects.  Continue to follow with PCP for overall health care otherwise.  Of note she reports they held her methotrexate  1/7/2025 for 2-month trial, duloxetine/Cymbalta one of the newer meds started this summer, metoclopramide/reglan 10 mg QID also started around November or December 2024 for gastroparesis, reports she was starting to get a tic of her right lip prior to the Bell's palsy.  We discussed could consider MRI brain if there are new or concerning features at any time.  Nurtec typically works for her migraines, not this unrelenting 1 and rizatriptan did not help either.  ER if any new or concerning symptoms.  - as of 3/17/2025: On Emgality every 28 days and acetazolamide 125 mg every 4 hours for a total of 500 mg daily which is generally allowed dose she has had about 1 migraine a week that typically resolves with Nurtec.  Headaches on average for a week and she finds these are little worse related to recent diagnosis of Bell's palsy which has significantly improved since last visit.  She took the dexamethasone after last visit and felt it helped and she did not trial the Depakote as she was afraid of too many medications and therefore has available at home if ever needed in the future. She has had significant/(I would say tremendous compared to some people) improvement in her Bell's palsy symptoms, possibly due to time passing, possibly due to stopping metoclopramide/Reglan 10 mg 4 times daily that have been started in 2024 for gastroparesis as she was developing a tic of her lip prior to the Bell's coming on, possibly related to resuming acetazolamide/Diamox, poss related to acupuncture and concretely improved with following with eye doctor weekly for gel and eyedrops for the eye that was open for so long and now is able to close, smile used to be pulled to the right now more in the middle, still some decreased left eyebrow raise and left side of the face feels just a little stiffer but overall majorly better.  She continues to have untreated sleep apnea and hypoxia at night and is aware I had a recommend resuming CPAP or  considering inspire and she is considering CPAP rather than going under anesthesia at this time and we discussed anything would be better than nothing and I absolutely think it is contributing to many symptoms and she is aware of the morbidity/mortality.  CMP for monitoring of electrolytes added to the Lyme test that she has not obtained yet through other providers.  - as of 6/3/2025: She reports she is doing so much better with current regimen including Emgality every 28 days for prevention, it tends to wear off in the last 1 to 2 weeks of the month and at any point we could consider Qulipta instead if she could get this authorized/covered to try and avoid wearing off effect, but would not want to make changes today specifically since she is having improvement.  She also has really liked the trial of Nurtec which she could take episodically during the month and currently finds it more helpful taken at the last 2 weeks of the month as Emgality is wearing off.  Rizatriptan helps for migraine rescue which she takes rarely otherwise often takes acetaminophen and sleeps very. She also finds acetazolamide/Diamox 125 mg every 4 hours very helpful and I suspect it is helpful due to possibly helping some with sleep apnea, would not get repeat sleep study while on it.  We discussed again I suspect the most treatable aspect to help with prevention would be her untreated sleep apnea at night this is contributing to significant morbidity and mortality, I again urged her to follow-up with sleep medicine or ENT for treatment.  She continues to follow with PCP and eye doctor regarding Bell's palsy with improvement, acupuncture weekly has been significantly helpful for this and migraines.  Still has some tightness on the cheek area, feels exacerbated at times by stress.  Discussed again she still could consider like TENS unit through PT if interested, she reports they gave her therapies to do at home.    Workup:  -Noncontrast head  CT 4/10/2023:  No acute intracranial abnormality. *we discussed as of my retrospective review 9/21/23, the nonspecific signs that I also see on MRI brain 2019 as below  - CTA head and neck with without contrast 07/23/2020:  1. No evidence of acute intracranial hemorrhage.  2. No evidence of hemodynamic significant stenosis, aneurysm or dissection.  - MRI brain with without contrast 08/01/2019:  A few nonspecific white matter lesions.  No acute infarction, intracranial hemorrhage or mass.*As of my retrospective review 2/22/2024 she has a partially empty sella that is nearly empty, bilateral optic nerve sheath prominence and tortuosity, tighter ventricles than expected for age 58, cerebellar tonsils overlie the foramen magnum with tight junction right greater than left without Chiari  - If she is open to it, we can obtain follow up MRI Brain to further evaluate at anytime     Preventative:  - we discussed headache hygiene and lifestyle factors that may improve headaches  -  continue Galcanezumab-gnlm 120 MG/ML SOAJ; Inject 120 mg under the skin every 28 days (3 month supply so she doesn't have to wait for the prescription delayed every month). Discussed proper use, possible side effects and risks.  -     acetaZOLAMIDE (DIAMOX) 125 mg tablet; take 1 tablet in the morning upon awakening and take every 4 hours while awake.  Stay hydrated.  Discussed proper use, off label use with certain meds, possible side effects and risks.  - On through other providers: hydroxychloriquine for years now,  ), duloxetine/cymbalta in am 30 mg in July 2024 and 8/27/2024 increased to 60 mg (for fibromyalgia pain - first two weeks cramps and N/V SE), Vit B12 1000 mcg,Vit D 50,000, atorvastatin/lipitor 10 mg,   - past/failed:  Amitriptyline, mirtazapine, topiramate,  Ambien, aimovig contraindicated due to constiptation and latex allergy, Chlorthalidone, acetazolamide SE dry mouth (on top of baseline dry mouth that she has just as bad off of  it due to Sjogren's) and tingling bothersome and wore off at 8 hours but couldn't feel she could increase to TID due to SE, metoclopramide/Reglan 10 mg  QID weaned off after visit Jan 2025 and just got off mid March 2024 due to possible TD to was on 3 months as of 1/28/25 - prior to bells palsy was getting a tic on her lip, Rabeprazole/Aciphex 40 mg twice daily for GERD,  stopped around July 2024 - mirtazapine 45 mg (through PCP, no longer using ambien at all in year), folic acid, Chlorthalidone 25 mg (held due to diamox improving BP),   - future options:   alternative CGRP med like Qulipta, botox     Abortive:  - discussed not taking over-the-counter or prescription pain medications more than 3 days per week to prevent medication overuse/rebound headache  - through other providers:  Ondansetron helps nausea  -   rimegepant sulfate (Nurtec) 75 mg TBDP; Take one NURTEC 75 mg at onset under tongue. Discussed proper use, possible side effects and risks.  -Rizatriptan 10 mg helps the most severe migraines, takes rarely due to SE. (1 at onset may repeat in 2 hours, max 2 per 24 hours, 3/week 9 per month) discussed proper use, possible side effects and risks.  - past/failed:  Sumatriptan 50 mg 100 mg, rizatriptan   - past/helped: toradol IM helped, steroids tolerated - prefers not to have it due to stomach irritation   - future options:  prochlorperazine, metoclopramide, Toradol IM or p.o., could consider trial for 5 days of Depakote or dexamethasone 4 prolonged migraine, ubrelvy, reyvow     Patient instructions      *Please consider following up with PCP for low mood screen today which showed signs of depression and I would be happy to place referral for talk therapy or medication management a time if you are interested, but I would generally defer to PCP for this and glad that you are seeing her today.    CMP ordered at past visit but appears they ellen other labs did not draw the lyme either from other doc - obtain  before next visit   - Reordered again for monitoring on diamox    - re metoclopramide/Reglan glad that you had GI wean you off this to make sure there is no aspects of tardive dyskinesia contributing since you are having mouth movements prior to the Bell's palsy diagnosis,- glad you are holding off this now and improved in case related    Either follow-up with sleep medicine for treatment of the sleep apnea or follow-up with ENT to assess for inspire device as untreated sleep apnea does raise your risk of stroke among many other issues.      Headache/migraine treatment:   Abortive medications (for immediate treatment of a headache):   It is ok to take ibuprofen, acetaminophen or naproxen (Advil, Tylenol,  Aleve, Excedrin) if they help your headaches you should limit these to No more than 3 times a week to avoid medication overuse/rebound headaches.      For your more moderate to severe migraines take this medication early   -     rimegepant sulfate (Nurtec) 75 mg TBDP; Take one NURTEC 75 mg at onset under tongue. Limit 1 in 24 hours.      Rizatriptan 10 mg tabs - take one at the onset of headache. May repeat one time after 1-2 hours if pain has not resolved.   (Max 2 a day and 9 a month)      To try and break a migraine if needed in the future since didn't take yet:  -     divalproex sodium (Depakote) 500 mg DR tablet; 500 mg p.o. nightly for 1-15 nights for unrelenting migraine    Prescription preventive medications for headaches/migraines   (to take every day to help prevent headaches - not to take at the time of headache):  [x]Emgality/Galcanezumab  120 mg injections every 28 days     Keep out of direct sunlight. Prior to administration, allow to come to room temperature for 30 minutes. Do not warm using a heat source (eg, microwave or hot water). Do not shake. Administer in abdomen (avoiding 2 inches around the navel), thigh, upper arm, or buttocks avoiding areas of skin that are tender, bruised, red or hard.  Deliver entire contents of single-use prefilled pen or syringe.       Continue   -     acetaZOLAMIDE (DIAMOX) 125 mg tablet; TAKE 1 TABLET BY MOUTH UPON AWAKENING AND EVERY 4 HOURS DURING THE DAY FOR 5 DOSES       - if a lower dose is helpful, can stay lower, if higher dose causes side effects, go back to last tolerated dose.  If you get all the way up to the dose recommended and is not helping enough let me know as I will absolutely go higher.    - make sure to stay hydrated while on this as can cause dehydration since that is it's purpose to take fluid off.   - most common side effect is tingling of the nerves at times, especially if you had neuropathy in the past it may cause tingling in these nerves, this is common and typically should get better over time.  It also can make carbonated beverages taste flat or given metallic taste to things.  -It can cause decreased appetite and weight loss in some individuals  - can cause electrolyte disturbances, but not typically in any significant way. However, be cautious if taking with other meds that lower potassium like hydrochlorothiazide etc    It is important to try and eat potassium rich foods while taking this  Potassium rich foods include:  - bananas, yogurt, sweet potatoes, tomato sauces, greens,  kidney and lima beans, lentils, split peas, soybeans, prunes, carrot or Orange juice, fish, milk        *Typically these types of medications take time untill you see the benefit, although some may see improvement in days, often it may take weeks, especially if the medication is being titrated up to a beneficial level. Please contact us if there are any concerns or questions regarding the medication.         Lifestyle Recommendations:  [x] SLEEP - Maintain a regular sleep schedule: Adults need at least 7-8 hours of uninterrupted a night. Maintain good sleep hygiene:  Going to bed and waking up at consistent times, avoiding excessive daytime naps, avoiding caffeinated  beverages in the evening, avoid excessive stimulation in the evening and generally using bed primarily for sleeping.  One hour before bedtime would recommend turning lights down lower, decreasing your activity (may read quietly, listen to music at a low volume). When you get into bed, should eliminate all technology (no texting, emailing, playing with your phone, iPad or tablet in bed).  [x] HYDRATION - Maintain good hydration.  Drink  2L of fluid a day (4 typical small water bottles)  [x] DIET - Maintain good nutrition. In particular don't skip meals and try and eat healthy balanced meals regularly.  [x] TRIGGERS - Look for other triggers and avoid them: Limit caffeine to 1-2 cups a day or less. Avoid dietary triggers that you have noticed bring on your headaches (this could include aged cheese, peanuts, MSG, aspartame and nitrates).  [x] EXERCISE - physical exercise as we all know is good for you in many ways, and not only is good for your heart, but also is beneficial for your mental health, cognitive health and  chronic pain/headaches. I would encourage at the least 5 days of physical exercise weekly for at least 30 minutes.      Education and Follow-up  [x] Please call with any questions or concerns. Of course if any new concerning symptoms go to the emergency department.  [x] Follow up 3-4 months, sooner if needed   Orders:    Comprehensive metabolic panel; Future    rizatriptan (MAXALT) 10 mg tablet; Take 1 tab at onset may repeat in 2 hours if needed, max 2 per 24 hours, 3/week, 9/month    DIPTI (obstructive sleep apnea)             History of Present Illness     Interval history as of 6/3/2025  - Of note/managed by others:   Please see MAR for details since last visit which include following up with GI for GERD gastroparesis IBS and constipation, mammogram  Slight tightness in cheek still, eye doctor once a week to monitor eye - improving - after this visit once a month   Depression Screening Follow-up Plan:  Patient's depression screening was positive with a PHQ-2 score of 3. Their PHQ-9 score was 11. Patient advised to follow-up with PCP for further management. Seeing her today  - no significant new or concerning neurologic symptoms since last visit reported  - diagnostics of note (please see EMR for others/details):   - You know she had in the lab her paperwork including CMP.  They did not obtain the CMP that I ordered or the Lyme study her PCP had ordered  Order through the providers,  4/28/25 vit D 24.2 -seeing them today to discuss  Through heme/onc  Iron 93  Ferritin 255 -improved a lot   42 iron sat   Transferrin 158  TIBC 221.2  UIBC 128   *seeing PCP today to discuss  - last eye exam: Following weekly, improving dry eye  - sleep: DIPTI not currently using CPAP (home study, BRANDI 16.1, supine 18.8, non supine 3.5, Oxygen down to 75%, while on acetazolamide/Diamox which also can treat sleep apnea and likely makes this number lower than it is) -following with sleep medicine -8/27/2024 also placed referral to ENT to consider inspire since no longer using CPAP   Again discussed importance of this, morbidity/mortality and how I think it is a root problem with a lot of her issues    Headaches and migraines   She reports she is doing so much better  Acupuncture is helping migraines as well, once a week, 85 bucks a week, relaxing as well  Right now processing putting dog to sleep, been trying to hold back, when anxious face gets stiffer and will get more headaches     On avg 1 headaches and one migraine a week. She stated Nurtec has been most helpful    Preventative:   - emgality every 28 days  - acetazolamide 125 mg every 4 hours - 4 doses   - On through other providers:  hydroxychloriquine for years now,  ), duloxetine/cymbalta in am 30 mg in July 2024 and 8/27/2024 increased to 60 mg (for fibromyalgia pain - first two weeks cramps and N/V SE), Vit B12 1000 mcg,Vit D 50,000, atorvastatin/lipitor 10 mg,     Abortive:    Nurtec - helping - faster response than emgality   Acetaminophen and sleep helps too   - rizatriptan rarely for back up needed     Zofran through other providers helps gastroparesis and IBS    In the past   trial of Depakote 500 mg at night and will refill dexamethasone for if severe although  -the steroid helped and didn't take the depakote as afraid of too many meds   - sometimes takes steroids for RA as needed - for a week only in [place]   No reported bothersome side effects         Objective   There were no vitals taken for this visit.      Objective:     Exam limited by Video  Physical Exam:                                                                 Vitals:            Constitutional:    There were no vitals taken for this visit.  BP Readings from Last 3 Encounters:   05/13/25 112/74   02/13/25 116/84   01/22/25 120/88     Pulse Readings from Last 3 Encounters:   05/13/25 75   02/13/25 90   01/22/25 97         Well developed, well nourished, No dysmorphic features.         Normocephalic atraumatic.     Normal behavior and appropriate affect        Able to answer questions appropriately, provide history of recent events   Only can see the eye slight facial asymmetry when she tries to smile with tightness on the left  Normal language and spontaneous speech.  facial expression symmetric  symmetric bulk throughout. no atrophy, fasciculations or significant abnormal movements noted during our visit from observation.            Administrative Statements   Encounter provider Natali Garcia MD    The Patient is located at Home and in the following state in which I hold an active license [place].    The patient was identified by name and date of birth. Baylee Tejeda Villafuerte was informed that this is a telemedicine visit and that the visit is being conducted through the Epic Embedded platform. She agrees to proceed..  My office door was closed. No one else was in the room.  She acknowledged consent and understanding of  privacy and security of the video platform. The patient has agreed to participate and understands they can discontinue the visit at any time.    I have spent a total time of 41 minutes in caring for this patient on the day of the visit/encounter including Diagnostic results, Prognosis, Risks and benefits of tx options, Instructions for management, Patient education, Importance of tx compliance, Risk factor reductions, Impressions, Counseling / Coordination of care, Documenting in the medical record, Reviewing/placing orders in the medical record (including tests, medications, and/or procedures), Obtaining or reviewing history  , and Communicating with other healthcare professionals , not including the time spent for establishing the audio/video connection.

## 2025-06-03 NOTE — TELEPHONE ENCOUNTER
Call placed to patient to schedule next follow up visit with Jose ROBERTS with appointment details 12/17 2p virtual visit. Advised to contact office if she needs to reschedule appointment.

## 2025-06-03 NOTE — ASSESSMENT & PLAN NOTE
Assessment/Plan:   Baylee Reza is a very pleasant 59 y.o. female with a past medical history that includes migraines, anxiety, depression, arthralgia, HTN, GERD,  IBS, hypercholesteremia, iron deficiency anemia, kidney stones, osteopenia, insomnia, bells palsy referred here for evaluation of headache.  My initial evaluation 8/19/2020     Migraine without aura without status migrainosus, not intractable  Features of idiopathic intracranial hypertension on imaging not meeting criteria for IIH without papilledema  DIPTI not currently using CPAP (home study, 11/2/23, BRANDI 16.1, supine 18.8, non supine 3.5, Oxygen down to 75%, while on acetazolamide/Diamox which also can treat sleep apnea and likely makes this number lower than it is) -following with sleep medicine -8/27/2024 also placed referral to ENT to consider inspire since no longer using CPAP, Again discussed importance of this, morbidity/mortality and how I think it is a root problem with a lot of her issues  She reports a long history of headaches and migraines that have varied in frequency and severity throughout life.  Pain is typically left frontal/retro-orbital and sometimes in the back.  She denies aura and reports typical associated migrainous features.  - as of 8/19/2020: chronic daily headaches and Migraines 3-4 days per week for the past year or so  - as of 1/29/2021:   Migraines have drastically improved to only 2-3 per month.  She did not start any new preventative and since she is so improved will hold off on adding anything at this time.  Continue sumatriptan 100 mg as needed for abortive.  - as of 8/27/2021: She reports Migraines a little worse to about 4 migraines a month, willing to add preventative as sumatriptan does not always work well, trial of emgality.   - as of 1/27/2022: Emgality helped a little initially after first loading dose and then pharmacy said she did not have refills and she did not call us so has not had since Sept. Now  under a lot of stress, getting about 4 migraines a month, daily mild migraines/headaches. Resume emgality one shot monthly. Continue sumatriptan for abortive.   - as of 7/28/2022: She reports significant improvement on emgality with decreased frequency and severity of migraines to mild 4 a month, and 2 severe a month. Did not realize she could still take sumatriptan so was just taking OTC meds, refilled sumatriptan/imitrex. toradol IM today for migraine since yesterday.  - as of 1/20/2023: She was doing great on Emgality, but last dose was in September or October due to mom passing away and had to leave town to help. Will resume emgality monthly as migraines are again 2-3 days a week, trial of rizatriptan in place of sumatriptan.   - as of 6/21/2023: She reports improvement on emgality, but not as significant improvement as would be expected and she believes due to the lapse in getting it and we discussed I believe it may be due to DIPTI causing subclinical IIH picture and highly recommend trial of acetazolamide/Diamox.  I believe subclinical IIH actually may be contributing to more symptoms than realized due to tight cerebellar anatomy.  Sleep study to evaluate for sleep apnea although home study can miss it, will likely need in lab.  We discussed this may be why she needs to sleep aids, blood pressure pill and it may be contributing to her vision issues that she has been told are cataracts only.  We discussed sleep apnea can cause significant morbidity/mortality issues if untreated.  Trial of rizatriptan works better than sumatriptan although she is not taking it lately due to starting methotrexate and was told not to take anything on it.  Review of recent CT shows multiple signs of increased intracranial pressure.   - as of 9/21/23: She reports improvement since last visit with on average 2 headaches a week due to emgality decreasing her migraines and acetazolamide 125 mg TID decreasing suspected subtle increased  intracranial pressure contributing, following closely with eye doctors and upcoming cataract surgery and no history of papilledema to call this IIH per standard criteria. PCP stopped her chlorthalidone due to BP normalizing. She reports being able to sleep longer and nap more, and I again encouraged scheduling sleep study, interestingly diamox also treats some forms of sleep apnea. Will increase diamox as tolerated to 125 mg 4 times a day while a wake to try and prevent wearing off and add night time dosing if needed when it would be wearing off the longest.  - as of 11/14/2023: She reports migraines remain improved on Emgality with on average 3 a week and rizatriptan typically helps without side effects.  She reports acetazolamide/Diamox 125 mg every 4 hours is helping although likely not enough especially since she is hardly sleeping at all and we discussed switching to 500 mg twice daily to see if this can help more with less wearing off effect and rebound high blood pressure and can help last throughout the night.  She indeed was diagnosed with sleep apnea at a moderate level and we discussed I highly recommend CPAP and nothing less for treatment despite being positional and she will be following up with sleep medicine in January.  In the meantime if needed can sleep on side or propped up in chair.  Following with eye doctor with persistent vision issues and he consistently does not see papilledema or signs that this would be related although we discussed I think it may be potentially impacting cataracts and she does have upcoming cataract surgery.   - as of 2/22/2024: She has had a difficult time tolerating CPAP thus far and we discussed some of the symptoms she thinks are related to the CPAP side effects sound potentially related to acetazolamide wearing off at 8-hour lucia rather than 12 and could consider increasing to 500 mg long-acting every 8 hours however she reports bothersome side effects and therefore  will instead transition to previous 125 mg every 4 hours for 1 to 2 weeks and then can take as needed if needed.  I suspect her headaches could get worse off of this medication, but treating the sleep apnea would help with this, and she received a new mask today that she is going to try.  Waking up with headaches also likely related to wearing off effect, otherwise 2-3 a week.  Emgality has decreased migraine days and will continue.  Rizatriptan worked more in the past and will add trial of Nurtec to see if this can help more and also help with prevention the more she takes it as well as with wearing off of emgality.   - as of 2024: She reports 1-2 migraines a week and rizatriptan helps sometimes and not others and she never received the Nurtec trial.  This is off of Emgality the last 2 months with last dose in March.  It appears the prior authorization  in January and we were not aware (but got through March due to 3 month supply), recommended she let us know in the future and will have the nursing team work on the prior Auth now.  Will also have the nursing team look into why the prior authorization was never sent to us for Nurtec can help with this as the more she takes Nurtec it also can help with prevention. She followed up with sleep medicine, they changed her from nasal pillow to fullface, decrease the pressure and is now using her CPAP more regularly, although may not use it some nights on average 7 and half hours with residual AHI 2.2.   - as of 2024: She reports headaches and migraines have improved since last visit and now on average once a week that improves with Nurtec.  She is not currently treating her sleep apnea and we discussed the potential morbidity/mortality of untreated sleep apnea especially at the moderate level with oxygen dropping to 75% and likely underestimated while on acetazolamide/Diamox at the time of the study.  I would recommend she make a follow-up with sleep  medicine to discuss sleep apnea being untreated and other options.  She is open to referral to Dr. Munoz to discuss inspire as an alternative as she already wanted to follow-up with ENT as well.  Continue Emgality for migraine prevention.  Since last visit others added duloxetine/Cymbalta for fibromyalgia and she will be increasing it from 30 mg to 60 mg soon.   - as of 1/28/2025: She reports headaches have been exacerbated by recent diagnosis of Bell's palsy 1/13/2025 with complete paralysis of the left side of the face including forehead.  She was evaluated in ER where noncontrast head CT was read as no acute pathology, given Valtrex and prednisone and PCP finish the prednisone course, she unfortunately then had oral thrush and is establishing care with physical therapy in 2 days for their assistance with the Bell's palsy as the left facial weakness remains significant.  This has been associated with a severe persistent headache with migrainous features and since she had tolerated and found acetazolamide helpful in the past we added this back 1/22/2025 (took 5 days to get) and I recommend continuing at least while headache is this bad due to the untreated sleep apnea which I still would recommend following up with ENT for consideration of inspire device.  We discussed we have multiple other medications to try and break this cycle and she would like to proceed with trial of Depakote 500 mg at night and will refill dexamethasone for if severe although she is aware of the risk/side effects.  Continue to follow with PCP for overall health care otherwise.  Of note she reports they held her methotrexate 1/7/2025 for 2-month trial, duloxetine/Cymbalta one of the newer meds started this summer, metoclopramide/reglan 10 mg QID also started around November or December 2024 for gastroparesis, reports she was starting to get a tic of her right lip prior to the Bell's palsy.  We discussed could consider MRI brain if there are  new or concerning features at any time.  Nurtec typically works for her migraines, not this unrelenting 1 and rizatriptan did not help either.  ER if any new or concerning symptoms.  - as of 3/17/2025: On Emgality every 28 days and acetazolamide 125 mg every 4 hours for a total of 500 mg daily which is generally allowed dose she has had about 1 migraine a week that typically resolves with Nurtec.  Headaches on average for a week and she finds these are little worse related to recent diagnosis of Bell's palsy which has significantly improved since last visit.  She took the dexamethasone after last visit and felt it helped and she did not trial the Depakote as she was afraid of too many medications and therefore has available at home if ever needed in the future. She has had significant/(I would say tremendous compared to some people) improvement in her Bell's palsy symptoms, possibly due to time passing, possibly due to stopping metoclopramide/Reglan 10 mg 4 times daily that have been started in 2024 for gastroparesis as she was developing a tic of her lip prior to the Bell's coming on, possibly related to resuming acetazolamide/Diamox, poss related to acupuncture and concretely improved with following with eye doctor weekly for gel and eyedrops for the eye that was open for so long and now is able to close, smile used to be pulled to the right now more in the middle, still some decreased left eyebrow raise and left side of the face feels just a little stiffer but overall majorly better.  She continues to have untreated sleep apnea and hypoxia at night and is aware I had a recommend resuming CPAP or considering inspire and she is considering CPAP rather than going under anesthesia at this time and we discussed anything would be better than nothing and I absolutely think it is contributing to many symptoms and she is aware of the morbidity/mortality.  CMP for monitoring of electrolytes added to the Lyme test that she  has not obtained yet through other providers.  - as of 6/3/2025: She reports she is doing so much better with current regimen including Emgality every 28 days for prevention, it tends to wear off in the last 1 to 2 weeks of the month and at any point we could consider Qulipta instead if she could get this authorized/covered to try and avoid wearing off effect, but would not want to make changes today specifically since she is having improvement.  She also has really liked the trial of Nurtec which she could take episodically during the month and currently finds it more helpful taken at the last 2 weeks of the month as Emgality is wearing off.  Rizatriptan helps for migraine rescue which she takes rarely otherwise often takes acetaminophen and sleeps very. She also finds acetazolamide/Diamox 125 mg every 4 hours very helpful and I suspect it is helpful due to possibly helping some with sleep apnea, would not get repeat sleep study while on it.  We discussed again I suspect the most treatable aspect to help with prevention would be her untreated sleep apnea at night this is contributing to significant morbidity and mortality, I again urged her to follow-up with sleep medicine or ENT for treatment.  She continues to follow with PCP and eye doctor regarding Bell's palsy with improvement, acupuncture weekly has been significantly helpful for this and migraines.  Still has some tightness on the cheek area, feels exacerbated at times by stress.  Discussed again she still could consider like TENS unit through PT if interested, she reports they gave her therapies to do at home.    Workup:  -Noncontrast head CT 4/10/2023:  No acute intracranial abnormality. *we discussed as of my retrospective review 9/21/23, the nonspecific signs that I also see on MRI brain 2019 as below  - CTA head and neck with without contrast 07/23/2020:  1. No evidence of acute intracranial hemorrhage.  2. No evidence of hemodynamic significant  stenosis, aneurysm or dissection.  - MRI brain with without contrast 08/01/2019:  A few nonspecific white matter lesions.  No acute infarction, intracranial hemorrhage or mass.*As of my retrospective review 2/22/2024 she has a partially empty sella that is nearly empty, bilateral optic nerve sheath prominence and tortuosity, tighter ventricles than expected for age 58, cerebellar tonsils overlie the foramen magnum with tight junction right greater than left without Chiari  - If she is open to it, we can obtain follow up MRI Brain to further evaluate at anytime     Preventative:  - we discussed headache hygiene and lifestyle factors that may improve headaches  -  continue Galcanezumab-gnlm 120 MG/ML SOAJ; Inject 120 mg under the skin every 28 days (3 month supply so she doesn't have to wait for the prescription delayed every month). Discussed proper use, possible side effects and risks.  -     acetaZOLAMIDE (DIAMOX) 125 mg tablet; take 1 tablet in the morning upon awakening and take every 4 hours while awake.  Stay hydrated.  Discussed proper use, off label use with certain meds, possible side effects and risks.  - On through other providers: hydroxychloriquine for years now,  ), duloxetine/cymbalta in am 30 mg in July 2024 and 8/27/2024 increased to 60 mg (for fibromyalgia pain - first two weeks cramps and N/V SE), Vit B12 1000 mcg,Vit D 50,000, atorvastatin/lipitor 10 mg,   - past/failed:  Amitriptyline, mirtazapine, topiramate,  Ambien, aimovig contraindicated due to constiptation and latex allergy, Chlorthalidone, acetazolamide SE dry mouth (on top of baseline dry mouth that she has just as bad off of it due to Sjogren's) and tingling bothersome and wore off at 8 hours but couldn't feel she could increase to TID due to SE, metoclopramide/Reglan 10 mg  QID weaned off after visit Jan 2025 and just got off mid March 2024 due to possible TD to was on 3 months as of 1/28/25 - prior to bells palsy was getting a tic on  her lip, Rabeprazole/Aciphex 40 mg twice daily for GERD,  stopped around July 2024 - mirtazapine 45 mg (through PCP, no longer using ambien at all in year), folic acid, Chlorthalidone 25 mg (held due to diamox improving BP),   - future options:   alternative CGRP med like Qulipta, botox     Abortive:  - discussed not taking over-the-counter or prescription pain medications more than 3 days per week to prevent medication overuse/rebound headache  - through other providers:  Ondansetron helps nausea  -   rimegepant sulfate (Nurtec) 75 mg TBDP; Take one NURTEC 75 mg at onset under tongue. Discussed proper use, possible side effects and risks.  -Rizatriptan 10 mg helps the most severe migraines, takes rarely due to SE. (1 at onset may repeat in 2 hours, max 2 per 24 hours, 3/week 9 per month) discussed proper use, possible side effects and risks.  - past/failed:  Sumatriptan 50 mg 100 mg, rizatriptan   - past/helped: toradol IM helped, steroids tolerated - prefers not to have it due to stomach irritation   - future options:  prochlorperazine, metoclopramide, Toradol IM or p.o., could consider trial for 5 days of Depakote or dexamethasone 4 prolonged migraine, ubrelvy, reyvow     Patient instructions      *Please consider following up with PCP for low mood screen today which showed signs of depression and I would be happy to place referral for talk therapy or medication management a time if you are interested, but I would generally defer to PCP for this and glad that you are seeing her today.    CMP ordered at past visit but appears they ellen other labs did not draw the lyme either from other doc - obtain before next visit   - Reordered again for monitoring on diamox    - re metoclopramide/Reglan glad that you had GI wean you off this to make sure there is no aspects of tardive dyskinesia contributing since you are having mouth movements prior to the Bell's palsy diagnosis,- glad you are holding off this now and improved  in case related    Either follow-up with sleep medicine for treatment of the sleep apnea or follow-up with ENT to assess for inspire device as untreated sleep apnea does raise your risk of stroke among many other issues.      Headache/migraine treatment:   Abortive medications (for immediate treatment of a headache):   It is ok to take ibuprofen, acetaminophen or naproxen (Advil, Tylenol,  Aleve, Excedrin) if they help your headaches you should limit these to No more than 3 times a week to avoid medication overuse/rebound headaches.      For your more moderate to severe migraines take this medication early   -     rimegepant sulfate (Nurtec) 75 mg TBDP; Take one NURTEC 75 mg at onset under tongue. Limit 1 in 24 hours.      Rizatriptan 10 mg tabs - take one at the onset of headache. May repeat one time after 1-2 hours if pain has not resolved.   (Max 2 a day and 9 a month)      To try and break a migraine if needed in the future since didn't take yet:  -     divalproex sodium (Depakote) 500 mg DR tablet; 500 mg p.o. nightly for 1-15 nights for unrelenting migraine    Prescription preventive medications for headaches/migraines   (to take every day to help prevent headaches - not to take at the time of headache):  [x]Emgality/Galcanezumab  120 mg injections every 28 days     Keep out of direct sunlight. Prior to administration, allow to come to room temperature for 30 minutes. Do not warm using a heat source (eg, microwave or hot water). Do not shake. Administer in abdomen (avoiding 2 inches around the navel), thigh, upper arm, or buttocks avoiding areas of skin that are tender, bruised, red or hard. Deliver entire contents of single-use prefilled pen or syringe.       Continue   -     acetaZOLAMIDE (DIAMOX) 125 mg tablet; TAKE 1 TABLET BY MOUTH UPON AWAKENING AND EVERY 4 HOURS DURING THE DAY FOR 5 DOSES       - if a lower dose is helpful, can stay lower, if higher dose causes side effects, go back to last tolerated  dose.  If you get all the way up to the dose recommended and is not helping enough let me know as I will absolutely go higher.    - make sure to stay hydrated while on this as can cause dehydration since that is it's purpose to take fluid off.   - most common side effect is tingling of the nerves at times, especially if you had neuropathy in the past it may cause tingling in these nerves, this is common and typically should get better over time.  It also can make carbonated beverages taste flat or given metallic taste to things.  -It can cause decreased appetite and weight loss in some individuals  - can cause electrolyte disturbances, but not typically in any significant way. However, be cautious if taking with other meds that lower potassium like hydrochlorothiazide etc    It is important to try and eat potassium rich foods while taking this  Potassium rich foods include:  - bananas, yogurt, sweet potatoes, tomato sauces, greens,  kidney and lima beans, lentils, split peas, soybeans, prunes, carrot or Orange juice, fish, milk        *Typically these types of medications take time untill you see the benefit, although some may see improvement in days, often it may take weeks, especially if the medication is being titrated up to a beneficial level. Please contact us if there are any concerns or questions regarding the medication.         Lifestyle Recommendations:  [x] SLEEP - Maintain a regular sleep schedule: Adults need at least 7-8 hours of uninterrupted a night. Maintain good sleep hygiene:  Going to bed and waking up at consistent times, avoiding excessive daytime naps, avoiding caffeinated beverages in the evening, avoid excessive stimulation in the evening and generally using bed primarily for sleeping.  One hour before bedtime would recommend turning lights down lower, decreasing your activity (may read quietly, listen to music at a low volume). When you get into bed, should eliminate all technology (no  texting, emailing, playing with your phone, iPad or tablet in bed).  [x] HYDRATION - Maintain good hydration.  Drink  2L of fluid a day (4 typical small water bottles)  [x] DIET - Maintain good nutrition. In particular don't skip meals and try and eat healthy balanced meals regularly.  [x] TRIGGERS - Look for other triggers and avoid them: Limit caffeine to 1-2 cups a day or less. Avoid dietary triggers that you have noticed bring on your headaches (this could include aged cheese, peanuts, MSG, aspartame and nitrates).  [x] EXERCISE - physical exercise as we all know is good for you in many ways, and not only is good for your heart, but also is beneficial for your mental health, cognitive health and  chronic pain/headaches. I would encourage at the least 5 days of physical exercise weekly for at least 30 minutes.      Education and Follow-up  [x] Please call with any questions or concerns. Of course if any new concerning symptoms go to the emergency department.  [x] Follow up 3-4 months, sooner if needed   Orders:    Comprehensive metabolic panel; Future    rizatriptan (MAXALT) 10 mg tablet; Take 1 tab at onset may repeat in 2 hours if needed, max 2 per 24 hours, 3/week, 9/month
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
## 2025-07-22 ENCOUNTER — ANNUAL EXAM (OUTPATIENT)
Age: 60
End: 2025-07-22
Payer: MEDICARE

## 2025-07-22 VITALS
HEIGHT: 64 IN | SYSTOLIC BLOOD PRESSURE: 114 MMHG | DIASTOLIC BLOOD PRESSURE: 72 MMHG | WEIGHT: 156 LBS | BODY MASS INDEX: 26.63 KG/M2

## 2025-07-22 DIAGNOSIS — Z12.31 ENCOUNTER FOR SCREENING MAMMOGRAM FOR BREAST CANCER: Primary | ICD-10-CM

## 2025-07-22 PROCEDURE — G0101 CA SCREEN;PELVIC/BREAST EXAM: HCPCS | Performed by: STUDENT IN AN ORGANIZED HEALTH CARE EDUCATION/TRAINING PROGRAM

## 2025-07-22 RX ORDER — DULOXETIN HYDROCHLORIDE 60 MG/1
CAPSULE, DELAYED RELEASE ORAL
COMMUNITY
Start: 2025-07-19

## 2025-08-10 ENCOUNTER — APPOINTMENT (EMERGENCY)
Dept: CT IMAGING | Facility: HOSPITAL | Age: 60
End: 2025-08-10
Payer: MEDICARE

## 2025-08-10 ENCOUNTER — HOSPITAL ENCOUNTER (EMERGENCY)
Facility: HOSPITAL | Age: 60
Discharge: HOME | End: 2025-08-10
Payer: MEDICARE

## 2025-08-13 ENCOUNTER — OFFICE VISIT (OUTPATIENT)
Dept: GASTROENTEROLOGY | Facility: CLINIC | Age: 60
End: 2025-08-13
Payer: MEDICARE